# Patient Record
Sex: MALE | Race: WHITE | NOT HISPANIC OR LATINO
[De-identification: names, ages, dates, MRNs, and addresses within clinical notes are randomized per-mention and may not be internally consistent; named-entity substitution may affect disease eponyms.]

---

## 2021-02-12 ENCOUNTER — NON-APPOINTMENT (OUTPATIENT)
Age: 56
End: 2021-02-12

## 2021-02-12 ENCOUNTER — APPOINTMENT (OUTPATIENT)
Dept: OPHTHALMOLOGY | Facility: CLINIC | Age: 56
End: 2021-02-12
Payer: COMMERCIAL

## 2021-02-12 PROCEDURE — 92004 COMPRE OPH EXAM NEW PT 1/>: CPT

## 2021-02-12 PROCEDURE — 92285 EXTERNAL OCULAR PHOTOGRAPHY: CPT

## 2021-02-12 PROCEDURE — 99072 ADDL SUPL MATRL&STAF TM PHE: CPT

## 2021-03-12 ENCOUNTER — APPOINTMENT (OUTPATIENT)
Dept: OPHTHALMOLOGY | Facility: CLINIC | Age: 56
End: 2021-03-12
Payer: MEDICAID

## 2021-03-12 ENCOUNTER — NON-APPOINTMENT (OUTPATIENT)
Age: 56
End: 2021-03-12

## 2021-03-12 PROCEDURE — 92025 CPTRIZED CORNEAL TOPOGRAPHY: CPT

## 2021-03-12 PROCEDURE — 99072 ADDL SUPL MATRL&STAF TM PHE: CPT

## 2021-03-12 PROCEDURE — 92136 OPHTHALMIC BIOMETRY: CPT

## 2021-03-12 PROCEDURE — 92012 INTRM OPH EXAM EST PATIENT: CPT

## 2021-04-03 ENCOUNTER — TRANSCRIPTION ENCOUNTER (OUTPATIENT)
Age: 56
End: 2021-04-03

## 2021-04-06 ENCOUNTER — NON-APPOINTMENT (OUTPATIENT)
Age: 56
End: 2021-04-06

## 2021-04-06 ENCOUNTER — APPOINTMENT (OUTPATIENT)
Dept: OPHTHALMOLOGY | Facility: AMBULATORY SURGERY CENTER | Age: 56
End: 2021-04-06
Payer: MEDICAID

## 2021-04-06 PROCEDURE — 66984 XCAPSL CTRC RMVL W/O ECP: CPT | Mod: LT

## 2021-04-07 ENCOUNTER — APPOINTMENT (OUTPATIENT)
Dept: OPHTHALMOLOGY | Facility: CLINIC | Age: 56
End: 2021-04-07
Payer: MEDICAID

## 2021-04-07 ENCOUNTER — NON-APPOINTMENT (OUTPATIENT)
Age: 56
End: 2021-04-07

## 2021-04-07 PROCEDURE — 99024 POSTOP FOLLOW-UP VISIT: CPT

## 2021-04-14 ENCOUNTER — APPOINTMENT (OUTPATIENT)
Dept: OPHTHALMOLOGY | Facility: CLINIC | Age: 56
End: 2021-04-14
Payer: MEDICAID

## 2021-04-14 ENCOUNTER — NON-APPOINTMENT (OUTPATIENT)
Age: 56
End: 2021-04-14

## 2021-04-14 PROCEDURE — 99024 POSTOP FOLLOW-UP VISIT: CPT

## 2021-05-03 ENCOUNTER — APPOINTMENT (OUTPATIENT)
Dept: OPHTHALMOLOGY | Facility: CLINIC | Age: 56
End: 2021-05-03
Payer: MEDICAID

## 2021-05-03 ENCOUNTER — NON-APPOINTMENT (OUTPATIENT)
Age: 56
End: 2021-05-03

## 2021-05-03 PROCEDURE — 99024 POSTOP FOLLOW-UP VISIT: CPT

## 2021-05-19 ENCOUNTER — HOSPITAL ENCOUNTER (INPATIENT)
Dept: HOSPITAL 74 - YASAS | Age: 56
LOS: 5 days | Discharge: OTHER INSTITUTION | DRG: 773 | End: 2021-05-24
Attending: ALLERGY & IMMUNOLOGY | Admitting: ALLERGY & IMMUNOLOGY
Payer: COMMERCIAL

## 2021-05-19 VITALS — BODY MASS INDEX: 28.1 KG/M2

## 2021-05-19 DIAGNOSIS — F19.24: ICD-10-CM

## 2021-05-19 DIAGNOSIS — Z98.42: ICD-10-CM

## 2021-05-19 DIAGNOSIS — F19.282: ICD-10-CM

## 2021-05-19 DIAGNOSIS — F41.9: ICD-10-CM

## 2021-05-19 DIAGNOSIS — F31.9: ICD-10-CM

## 2021-05-19 DIAGNOSIS — F19.280: ICD-10-CM

## 2021-05-19 DIAGNOSIS — Z88.0: ICD-10-CM

## 2021-05-19 DIAGNOSIS — F11.23: Primary | ICD-10-CM

## 2021-05-19 DIAGNOSIS — F17.210: ICD-10-CM

## 2021-05-19 DIAGNOSIS — Z91.5: ICD-10-CM

## 2021-05-19 DIAGNOSIS — Z59.0: ICD-10-CM

## 2021-05-19 DIAGNOSIS — F43.10: ICD-10-CM

## 2021-05-19 DIAGNOSIS — Z56.0: ICD-10-CM

## 2021-05-19 PROCEDURE — U0003 INFECTIOUS AGENT DETECTION BY NUCLEIC ACID (DNA OR RNA); SEVERE ACUTE RESPIRATORY SYNDROME CORONAVIRUS 2 (SARS-COV-2) (CORONAVIRUS DISEASE [COVID-19]), AMPLIFIED PROBE TECHNIQUE, MAKING USE OF HIGH THROUGHPUT TECHNOLOGIES AS DESCRIBED BY CMS-2020-01-R: HCPCS

## 2021-05-19 PROCEDURE — C9803 HOPD COVID-19 SPEC COLLECT: HCPCS

## 2021-05-19 PROCEDURE — HZ2ZZZZ DETOXIFICATION SERVICES FOR SUBSTANCE ABUSE TREATMENT: ICD-10-PCS | Performed by: ALLERGY & IMMUNOLOGY

## 2021-05-19 PROCEDURE — U0005 INFEC AGEN DETEC AMPLI PROBE: HCPCS

## 2021-05-19 RX ADMIN — Medication SCH: at 23:14

## 2021-05-19 RX ADMIN — HYDROXYZINE PAMOATE SCH: 25 CAPSULE ORAL at 23:14

## 2021-05-19 SDOH — ECONOMIC STABILITY - HOUSING INSECURITY: HOMELESSNESS: Z59.0

## 2021-05-19 SDOH — ECONOMIC STABILITY - INCOME SECURITY: UNEMPLOYMENT, UNSPECIFIED: Z56.0

## 2021-05-20 LAB
ALBUMIN SERPL-MCNC: 3.2 G/DL (ref 3.4–5)
ALP SERPL-CCNC: 94 U/L (ref 45–117)
ALT SERPL-CCNC: 24 U/L (ref 13–61)
ANION GAP SERPL CALC-SCNC: 4 MMOL/L (ref 8–16)
AST SERPL-CCNC: 20 U/L (ref 15–37)
BILIRUB SERPL-MCNC: 0.4 MG/DL (ref 0.2–1)
BUN SERPL-MCNC: 13.1 MG/DL (ref 7–18)
CALCIUM SERPL-MCNC: 8.5 MG/DL (ref 8.5–10.1)
CHLORIDE SERPL-SCNC: 107 MMOL/L (ref 98–107)
CO2 SERPL-SCNC: 31 MMOL/L (ref 21–32)
CREAT SERPL-MCNC: 0.9 MG/DL (ref 0.55–1.3)
DEPRECATED RDW RBC AUTO: 14.5 % (ref 11.9–15.9)
GLUCOSE SERPL-MCNC: 80 MG/DL (ref 74–106)
HCT VFR BLD CALC: 39 % (ref 35.4–49)
HGB BLD-MCNC: 13.1 GM/DL (ref 11.7–16.9)
MCH RBC QN AUTO: 32 PG (ref 25.7–33.7)
MCHC RBC AUTO-ENTMCNC: 33.7 G/DL (ref 32–35.9)
MCV RBC: 95.1 FL (ref 80–96)
PLATELET # BLD AUTO: 289 K/MM3 (ref 134–434)
PMV BLD: 7.6 FL (ref 7.5–11.1)
PROT SERPL-MCNC: 6 G/DL (ref 6.4–8.2)
RBC # BLD AUTO: 4.1 M/MM3 (ref 4–5.6)
SODIUM SERPL-SCNC: 142 MMOL/L (ref 136–145)
WBC # BLD AUTO: 8.6 K/MM3 (ref 4–10)

## 2021-05-20 RX ADMIN — HYDROXYZINE PAMOATE SCH MG: 50 CAPSULE ORAL at 22:01

## 2021-05-20 RX ADMIN — Medication SCH TAB: at 10:30

## 2021-05-20 RX ADMIN — Medication SCH MG: at 22:01

## 2021-05-20 RX ADMIN — HYDROXYZINE PAMOATE SCH MG: 25 CAPSULE ORAL at 10:30

## 2021-05-20 RX ADMIN — GABAPENTIN SCH MG: 300 CAPSULE ORAL at 22:01

## 2021-05-20 RX ADMIN — HYDROXYZINE PAMOATE SCH MG: 50 CAPSULE ORAL at 18:19

## 2021-05-20 RX ADMIN — Medication SCH MG: at 22:00

## 2021-05-20 RX ADMIN — HYDROXYZINE PAMOATE SCH MG: 25 CAPSULE ORAL at 05:29

## 2021-05-20 RX ADMIN — GABAPENTIN SCH MG: 300 CAPSULE ORAL at 18:19

## 2021-05-21 RX ADMIN — HYDROXYZINE PAMOATE SCH MG: 50 CAPSULE ORAL at 10:20

## 2021-05-21 RX ADMIN — Medication SCH MG: at 22:46

## 2021-05-21 RX ADMIN — Medication SCH TAB: at 10:20

## 2021-05-21 RX ADMIN — HYDROXYZINE PAMOATE SCH MG: 50 CAPSULE ORAL at 18:32

## 2021-05-21 RX ADMIN — HYDROXYZINE PAMOATE SCH MG: 50 CAPSULE ORAL at 22:46

## 2021-05-21 RX ADMIN — HYDROXYZINE PAMOATE SCH MG: 50 CAPSULE ORAL at 06:31

## 2021-05-21 RX ADMIN — GABAPENTIN SCH MG: 300 CAPSULE ORAL at 06:31

## 2021-05-21 RX ADMIN — METHOCARBAMOL PRN MG: 500 TABLET ORAL at 22:46

## 2021-05-21 RX ADMIN — GABAPENTIN SCH MG: 300 CAPSULE ORAL at 13:56

## 2021-05-21 RX ADMIN — METHOCARBAMOL PRN MG: 500 TABLET ORAL at 10:20

## 2021-05-21 RX ADMIN — GABAPENTIN SCH MG: 300 CAPSULE ORAL at 22:46

## 2021-05-22 RX ADMIN — GABAPENTIN SCH MG: 300 CAPSULE ORAL at 13:25

## 2021-05-22 RX ADMIN — GABAPENTIN SCH MG: 300 CAPSULE ORAL at 23:06

## 2021-05-22 RX ADMIN — HYDROXYZINE PAMOATE SCH MG: 50 CAPSULE ORAL at 10:08

## 2021-05-22 RX ADMIN — SUVOREXANT PRN MG: 10 TABLET, FILM COATED ORAL at 23:06

## 2021-05-22 RX ADMIN — Medication SCH MG: at 23:06

## 2021-05-22 RX ADMIN — HYDROXYZINE PAMOATE SCH MG: 50 CAPSULE ORAL at 23:06

## 2021-05-22 RX ADMIN — HYDROXYZINE PAMOATE SCH MG: 50 CAPSULE ORAL at 17:25

## 2021-05-22 RX ADMIN — Medication SCH TAB: at 10:07

## 2021-05-22 RX ADMIN — GABAPENTIN SCH MG: 300 CAPSULE ORAL at 06:41

## 2021-05-22 RX ADMIN — HYDROXYZINE PAMOATE SCH MG: 50 CAPSULE ORAL at 06:42

## 2021-05-23 RX ADMIN — GABAPENTIN SCH MG: 300 CAPSULE ORAL at 22:07

## 2021-05-23 RX ADMIN — HYDROXYZINE PAMOATE SCH MG: 50 CAPSULE ORAL at 11:25

## 2021-05-23 RX ADMIN — METHOCARBAMOL PRN MG: 500 TABLET ORAL at 10:19

## 2021-05-23 RX ADMIN — GABAPENTIN SCH MG: 300 CAPSULE ORAL at 14:11

## 2021-05-23 RX ADMIN — GABAPENTIN SCH MG: 300 CAPSULE ORAL at 06:38

## 2021-05-23 RX ADMIN — Medication SCH TAB: at 10:19

## 2021-05-23 RX ADMIN — HYDROXYZINE PAMOATE SCH MG: 50 CAPSULE ORAL at 18:12

## 2021-05-23 RX ADMIN — HYDROXYZINE PAMOATE SCH MG: 50 CAPSULE ORAL at 22:08

## 2021-05-23 RX ADMIN — SUVOREXANT PRN MG: 10 TABLET, FILM COATED ORAL at 22:10

## 2021-05-23 RX ADMIN — HYDROXYZINE PAMOATE SCH MG: 50 CAPSULE ORAL at 06:38

## 2021-05-23 RX ADMIN — Medication SCH MG: at 22:07

## 2021-05-24 VITALS — HEART RATE: 75 BPM | SYSTOLIC BLOOD PRESSURE: 150 MMHG | DIASTOLIC BLOOD PRESSURE: 80 MMHG | TEMPERATURE: 98.2 F

## 2021-05-24 RX ADMIN — Medication SCH TAB: at 10:47

## 2021-05-24 RX ADMIN — GABAPENTIN SCH MG: 300 CAPSULE ORAL at 06:01

## 2021-05-24 RX ADMIN — HYDROXYZINE PAMOATE SCH: 50 CAPSULE ORAL at 05:54

## 2021-05-24 RX ADMIN — HYDROXYZINE PAMOATE SCH MG: 50 CAPSULE ORAL at 10:47

## 2021-06-02 ENCOUNTER — APPOINTMENT (OUTPATIENT)
Dept: OPHTHALMOLOGY | Facility: CLINIC | Age: 56
End: 2021-06-02

## 2023-08-07 ENCOUNTER — EMERGENCY (EMERGENCY)
Facility: HOSPITAL | Age: 58
LOS: 1 days | Discharge: HOME | End: 2023-08-07
Attending: EMERGENCY MEDICINE | Admitting: EMERGENCY MEDICINE
Payer: MEDICAID

## 2023-08-07 VITALS
SYSTOLIC BLOOD PRESSURE: 128 MMHG | RESPIRATION RATE: 18 BRPM | DIASTOLIC BLOOD PRESSURE: 86 MMHG | HEART RATE: 100 BPM | OXYGEN SATURATION: 99 % | TEMPERATURE: 99 F

## 2023-08-07 VITALS
TEMPERATURE: 97 F | SYSTOLIC BLOOD PRESSURE: 110 MMHG | OXYGEN SATURATION: 95 % | RESPIRATION RATE: 16 BRPM | DIASTOLIC BLOOD PRESSURE: 68 MMHG | HEART RATE: 72 BPM

## 2023-08-07 PROCEDURE — 99284 EMERGENCY DEPT VISIT MOD MDM: CPT

## 2023-08-07 RX ORDER — HALOPERIDOL DECANOATE 100 MG/ML
5 INJECTION INTRAMUSCULAR EVERY 6 HOURS
Refills: 0 | Status: DISCONTINUED | OUTPATIENT
Start: 2023-08-07 | End: 2023-08-11

## 2023-08-07 RX ADMIN — Medication 2 MILLIGRAM(S): at 12:48

## 2023-08-07 RX ADMIN — HALOPERIDOL DECANOATE 5 MILLIGRAM(S): 100 INJECTION INTRAMUSCULAR at 12:48
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
## 2023-08-07 NOTE — ED PROVIDER NOTE - NSFOLLOWUPINSTRUCTIONS_ED_ALL_ED_FT
Abuse of Alcohol    WHAT YOU NEED TO KNOW:    Alcohol abuse means you drink more than the recommended daily or weekly limits. You may be drinking alcohol regularly or drinking large amounts in a short period of time (binge drinking). You continue to drink even when it causes legal, work, or relationship problems.    DISCHARGE INSTRUCTIONS:    Call your local emergency number (911 in the ), or have someone call if:    You have sudden chest pain or trouble breathing.    You want to harm yourself or others.    You have a seizure.  Seek care immediately if:    You cannot stop vomiting or you vomit blood.    Call your doctor if:    You have hallucinations (you see or hear things that are not real).    You have questions or concerns about your condition or care.  Medicines:    Vitamin supplements may be given to treat low vitamin levels. Alcohol can make it hard for your body to absorb enough vitamins such as B1. Vitamin supplements may also be given to prevent alcohol-related brain damage.    Take your medicine as directed. Contact your healthcare provider if you think your medicine is not helping or if you have side effects. Tell your provider if you are allergic to any medicine. Keep a list of the medicines, vitamins, and herbs you take. Include the amounts, and when and why you take them. Bring the list or the pill bottles to follow-up visits. Carry your medicine list with you in case of an emergency.  Health problems alcohol abuse can cause:    Cancer in your liver, pancreas, stomach, colon, kidney, or breast    Stroke or a heart attack    Liver, kidney, or lung disease    Blackouts, memory loss, brain damage, or dementia    Diabetes, immune system problems, or thiamine (vitamin B1) deficiency    Problems for you and your baby if you drink while pregnant  Recommended alcohol limits: One drink is defined as 12 oz of beer, 5 oz of wine, or 1.5 oz of liquor such as whiskey.    Men 21 to 64 years should limit alcohol to 2 drinks a day. Do not have more than 4 drinks in 1 day or more than 14 in 1 week.    All women, and men 65 or older should limit alcohol to 1 drink in a day. Do not have more than 3 drinks in 1 day or more than 7 in 1 week. Do not drink alcohol if you are pregnant.  Manage alcohol use:    Work with healthcare providers on goals to drink less. This can help prevent health problems. For example, you may start by planning your weekly alcohol use. It will be easier to have fewer drinks if you plan ahead.    Have food when you drink alcohol. Food will prevent alcohol from getting into your system too quickly. Eat before you have your first alcohol drink.    Time your drinks carefully. Have no more than 1 drink in an hour. Have a liquid such as water, coffee, or a soft drink between alcohol drinks.    Do not drive if you have had alcohol. Plan ahead so you have a safe ride home. Make sure someone who has not been drinking can help you get home safely. Plan to use a taxi or other ride service, if needed.    Do not drink alcohol if you are taking medicine. Alcohol is dangerous when you combine it with certain medicines, such as acetaminophen or blood pressure medicine. Talk to your healthcare provider about all the medicines you currently take.  Follow up with your doctor as directed: Write down your questions so you remember to ask them during your visits.    For support and more information:    Alcoholics Anonymous  Web Address: http://www.aa.org  Substance Abuse and Mental Health Services Administration  [address]  [place] [address]  Web Address: http://www.samhsa.gov

## 2023-08-07 NOTE — ED PROVIDER NOTE - PROGRESS NOTE DETAILS
Marilyn: Patient seen and examined in triage. Combative and angry, swinging at staff. decision's made with staff and security to give 5 and 2 allergies confirmed. Iraida PGY2: Patient reevaluated, is now clinically sober, will obtain repeat vitals and discharge. Received patient on signout from Dr. Chairez  Patient woke up had something to eat, alert and oriented.  Has no complaints other than asking to eat.  Denies any trauma.  Now ambulatory without requiring any assistance.  Will repeat vitals and discharge home.
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
## 2023-08-07 NOTE — PROVIDER CONTACT NOTE (OTHER) - ASSESSMENT
Met with pt at bedside; pt stays in a tent in [place]; pt's girlfriend/partner is dorie being discharged and they will leave together.  [employer] requested-they will return to their tent area.

## 2023-08-07 NOTE — ED PROVIDER NOTE - NSICDXPASTMEDICALHX_GEN_ALL_CORE_FT
PAST MEDICAL HISTORY:  Asthma     Blind, unilateral left eye    Chronic back pain greater than 3 months duration

## 2023-08-07 NOTE — ED PROVIDER NOTE - PATIENT PORTAL LINK FT
You can access the FollowMyHealth Patient Portal offered by [facility] by registering at the following website: [email or web address]. By joining [employer]’s FollowMyHealth portal, you will also be able to view your health information using other applications (apps) compatible with our system.

## 2023-08-07 NOTE — ED ADULT NURSE NOTE - OBJECTIVE STATEMENT
Patient presenting to the ED for possible alcohol intoxication. The patient was noted aggressive and combative at triage and was placed in 4 point restraints. The patient was given medication by triage nurse. Patient noted sleeping at this time.  No distress noted at this time. Patient withdraws from pain. STEPHANIE VALLES

## 2023-08-07 NOTE — ED PROVIDER NOTE - PHYSICAL EXAMINATION
Patient found sleeping comfortably in the stretcher after chemical sedation.  Two-point restraints placed.  Patient breathing on room air and maintaining his airway. Patient found sleeping comfortably in the stretcher after chemical sedation.  Two-point restraints placed.  Patient breathing on room air and maintaining his airway. L pupil deformity iso hx traumatic cataract. R eye pinpoint. Patient arousal with sternal rub.

## 2023-08-07 NOTE — ED PROVIDER NOTE - OBJECTIVE STATEMENT
57-year-old male patient past medical history of asthma, EtOH abuse brought in via EMS for agitation in the setting of alcohol intoxication.  Patient became agitated in the ambulance bay and was a threat to staff.  Patient was chemically sedated with Haldol and Ativan.  Currently sleeping on the stretcher with two-point restraints.

## 2023-08-07 NOTE — ED PROVIDER NOTE - ATTENDING CONTRIBUTION TO CARE
57 M brought in by EMS for presumed intoxication.  Patient at time of triage was verbal, endorsed drinking alcohol, but increasingly agitated.  Evaluation at triage deemed patient threatening and hostile and received sedation.  Then sent to main area of ED for continued evaluation.  Patient received in 4 points and with increasing lethargy, withdrawing to pain, nontender abdominal exam.  No obvious signs of trauma. R pupil 2mm, sluggish, L pupil irreg/dilated, hx of blindness in L eye, neck NT to palp, clear lungs, shallow resp, heart reg, abd soft, NT to palp, moves all ext. to physical stim, good distal pulses, no ecchymoses.

## 2023-08-07 NOTE — ED ADULT NURSE NOTE - NSFALLHARMRISKINTERV_ED_ALL_ED

## 2023-08-07 NOTE — ED ADULT NURSE NOTE - NS ED PATIENT SAFETY CONCERN
Clear bilaterally, pupils equal, round and reactive to light.
Unable to assess due to medical condition

## 2024-01-09 ENCOUNTER — EMERGENCY (EMERGENCY)
Facility: HOSPITAL | Age: 59
LOS: 1 days | Discharge: HOME | End: 2024-01-09
Attending: EMERGENCY MEDICINE | Admitting: EMERGENCY MEDICINE
Payer: MEDICAID

## 2024-01-09 VITALS
RESPIRATION RATE: 18 BRPM | OXYGEN SATURATION: 95 % | TEMPERATURE: 98 F | SYSTOLIC BLOOD PRESSURE: 169 MMHG | DIASTOLIC BLOOD PRESSURE: 83 MMHG | HEART RATE: 99 BPM

## 2024-01-09 PROCEDURE — 99284 EMERGENCY DEPT VISIT MOD MDM: CPT

## 2024-01-09 RX ORDER — DIPHENHYDRAMINE HCL 50 MG
50 CAPSULE ORAL ONCE
Refills: 0 | Status: COMPLETED | OUTPATIENT
Start: 2024-01-09 | End: 2024-01-09

## 2024-01-09 RX ORDER — HALOPERIDOL DECANOATE 100 MG/ML
5 INJECTION INTRAMUSCULAR ONCE
Refills: 0 | Status: COMPLETED | OUTPATIENT
Start: 2024-01-09 | End: 2024-01-09

## 2024-01-09 RX ADMIN — HALOPERIDOL DECANOATE 5 MILLIGRAM(S): 100 INJECTION INTRAMUSCULAR at 16:33

## 2024-01-09 RX ADMIN — Medication 2 MILLIGRAM(S): at 16:33

## 2024-01-09 RX ADMIN — Medication 50 MILLIGRAM(S): at 16:33

## 2024-01-09 NOTE — ED PROVIDER NOTE - PHYSICAL EXAMINATION
VS:  unremarkable except HTN    GEN - Appears intoxicated, superficially cooperative   A+O x3   HEAD - NC/AT     ENT - PEERL, EOMI, mucous membranes    dry , no discharge      NECK: Neck supple, non-tender without lymphadenopathy, no masses, no JVD  PULM - CTA b/l,  symmetric breath sounds  COR -  normal heart sounds    ABD - , ND, NT, soft,  BACK - no CVA tenderness, nontender spine     EXTREMS - no edema, no deformity, warm and well perfused    SKIN - no rash    or bruising      NEUROLOGIC - alert, face symmetric, speech fluent, sensation nl, motor no focal deficit.

## 2024-01-09 NOTE — ED PROVIDER NOTE - PROGRESS NOTE DETAILS
Reviewed self referred Heart Scan result with patient. CAC score is 0.   Discussed heart healthy lifestyle and importance of risk factor modification with patient, as well as importance of continued compliance with PCP screenings for HTN, diabetes, cholesterol, etc. Pt verbalized understanding and denies any further questions or concerns at present.    Incidental findings of dilated pulmonary artery also reviewed with patient, she will follow up with her PCP.   DD ED ATTG: pt reassessed, still sleeping.  Reass upon awakening. OVIDIO: Was signed out to me pending sobriety and reassessment in the morning.  Patient now awake and alert and his gait is steady and stable.  Patient has no complaints of pain or other discomfort.  Will discharge patient home at this time.  Return precautions explained and understood and strongly advised to decrease alcohol ingestion. DD ED ATTG: pt reassessed, still sleeping.  Reass upon awakening.  No longer in restraints.

## 2024-01-09 NOTE — ED ADULT NURSE NOTE - NSFALLRISKINTERV_ED_ALL_ED
Assistance OOB with selected safe patient handling equipment if applicable/Assistance with ambulation/Communicate fall risk and risk factors to all staff, patient, and family/Encourage patient to sit up slowly, dangle for a short time, stand at bedside before walking/Monitor gait and stability/Monitor for mental status changes and reorient to person, place, and time, as needed/Orthostatic vital signs/Provide visual cue: yellow wristband, yellow gown, etc/Reinforce activity limits and safety measures with patient and family/Review medications for side effects contributing to fall risk/Toileting schedule using arm’s reach rule for commode and bathroom/Use of alarms - bed, stretcher, chair and/or video monitoring/Call bell, personal items and telephone in reach/Instruct patient to call for assistance before getting out of bed/chair/stretcher/Non-slip footwear applied when patient is off stretcher/[place] to call system/Physically safe environment - no spills, clutter or unnecessary equipment/Purposeful Proactive Rounding/Room/bathroom lighting operational, light cord in reach Assistance OOB with selected safe patient handling equipment if applicable/Assistance with ambulation/Communicate fall risk and risk factors to all staff, patient, and family/Encourage patient to sit up slowly, dangle for a short time, stand at bedside before walking/Monitor gait and stability/Monitor for mental status changes and reorient to person, place, and time, as needed/Orthostatic vital signs/Provide visual cue: yellow wristband, yellow gown, etc/Reinforce activity limits and safety measures with patient and family/Review medications for side effects contributing to fall risk/Toileting schedule using arm’s reach rule for commode and bathroom/Use of alarms - bed, stretcher, chair and/or video monitoring/Call bell, personal items and telephone in reach/Instruct patient to call for assistance before getting out of bed/chair/stretcher/Non-slip footwear applied when patient is off stretcher/[place] to call system/Physically safe environment - no spills, clutter or unnecessary equipment/Purposeful Proactive Rounding/Room/bathroom lighting operational, light cord in reach

## 2024-01-09 NOTE — ED PROVIDER NOTE - NSFOLLOWUPINSTRUCTIONS_ED_ALL_ED_FT
FOLLOW UP WITH YOUR DOCTOR WITHIN 1 WEEK    CAN FOLLOW UP WITH [facility]    RETURN TO EMERGENCY DEPARTMENT FOR NEW OR WORSENING SYMPTOMS.
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
## 2024-01-09 NOTE — ED PROVIDER NOTE - CARE PLAN
1 Principal Discharge DX:	Alcohol intoxication  Secondary Diagnosis:	Agitation requiring sedation protocol
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
## 2024-01-09 NOTE — ED ADULT NURSE REASSESSMENT NOTE - NS ED NURSE REASSESS COMMENT FT1
Received pt and report at change of shift. Pt is sleeping in stretcher s/p IM meds received, respirations are even and unlabored. Pending medical reassessment and dispo when awake and clinically sober. Will continue to monitor for safety.

## 2024-01-09 NOTE — ED PROVIDER NOTE - PATIENT PORTAL LINK FT
You can access the FollowMyHealth Patient Portal offered by [facility] by registering at the following website: [email or web address]. By joining [employer]’s FollowMyHealth portal, you will also be able to view your health information using other applications (apps) compatible with our system. You can access the FollowMyHealth Patient Portal offered by [facility] by registering at the following website: [email or web address]. By joining [employer]s FollowMyHealth portal, you will also be able to view your health information using other applications (apps) compatible with our system.
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
## 2024-01-10 VITALS
SYSTOLIC BLOOD PRESSURE: 133 MMHG | OXYGEN SATURATION: 100 % | HEART RATE: 91 BPM | RESPIRATION RATE: 16 BRPM | TEMPERATURE: 98 F | DIASTOLIC BLOOD PRESSURE: 69 MMHG

## 2024-01-10 NOTE — ED ADULT NURSE REASSESSMENT NOTE - NS ED NURSE REASSESS COMMENT FT1
Pt awakened, a&ox3, calm and cooperative. Pt denies HA, dizziness, NV. Vitals as stated, ambulating with steady gait to bathroom,. Viky hydration provided and tolerated. Pt denies current S/I H/I I/P. Pending medical reassessment and dispo. Will continue to monitor for safety.

## 2024-06-06 ENCOUNTER — EMERGENCY (EMERGENCY)
Facility: HOSPITAL | Age: 59
LOS: 1 days | Discharge: HOME | End: 2024-06-06
Attending: STUDENT IN AN ORGANIZED HEALTH CARE EDUCATION/TRAINING PROGRAM | Admitting: EMERGENCY MEDICINE
Payer: MEDICAID

## 2024-06-06 VITALS
WEIGHT: 198.42 LBS | HEART RATE: 82 BPM | HEIGHT: 68 IN | OXYGEN SATURATION: 98 % | RESPIRATION RATE: 18 BRPM | DIASTOLIC BLOOD PRESSURE: 78 MMHG | SYSTOLIC BLOOD PRESSURE: 130 MMHG

## 2024-06-06 PROCEDURE — 99283 EMERGENCY DEPT VISIT LOW MDM: CPT

## 2024-06-06 PROCEDURE — 93010 ELECTROCARDIOGRAM REPORT: CPT

## 2024-06-06 NOTE — ED PROVIDER NOTE - NSFOLLOWUPINSTRUCTIONS_ED_ALL_ED_FT
Overview:  You were brought in due to alcohol intoxication. You have been observed and are now considered clinically sober. Before you leave, please read the following instructions carefully to ensure your safety and well-being.    Instructions:    Supervision:    Do not drive, operate machinery, or perform any activities that require full alertness for at least 24 hours.  Hydration and Nutrition:    Drink water or electrolyte-replenishing beverages to stay hydrated.  Eat light and easily digestible foods to help restore nutrients.  Rest:    Ensure you get adequate rest. Alcohol can disrupt your sleep cycle, so it's important to allow your body to recover.  Alcohol Consumption:    Avoid consuming alcohol for at least 48 hours. Consider this incident as an opportunity to reflect on your drinking habits and the impact on your health.  Medication:    Do not take any medication without consulting your healthcare provider, especially those that can interact with alcohol (e.g., sedatives, certain pain medications).  Monitoring:    Monitor your health. If you experience unusual symptoms such as severe headache, persistent vomiting, seizures, difficulty breathing, chest pain, or any new or concerning symptoms, seek medical attention immediately.  Follow-up Care:    Consider scheduling an appointment with your primary care provider or a counselor to discuss your alcohol use and any necessary lifestyle changes.  Safety and Support:    If you feel that you or someone else is at risk of harm or if you have concerns about alcohol dependence, seek support. There are resources available to provide help.

## 2024-06-06 NOTE — ED PROVIDER NOTE - PROGRESS NOTE DETAILS
Edwar PGY3: Patient endorsed to me at sign out. Seen and assessed at bedside - clinically sober, states would go home with wife on bus, requesting [employer]. States they called ems because 'too drunk'. Denies fighting. Requesting clothes back. Has ambulated and tolerated PO. Offered SW and SBIRT info but he states he has and declines Uziel FRENCH: Received in sign out pending sobriety.  Clinically sober ambulating around ED eager to go home

## 2024-06-06 NOTE — ED ADULT NURSE NOTE - OBJECTIVE STATEMENT
Pt arrives to room 7A A&Ox3 and ambulatory at baseline. PH of Asthma. Pt brought to ED by wife for intoxication. Pt states he drinks everyday, "anywhere from 1-2 pints of vodka". Pt states drinking 2 pints of vodka prior to arrival. Pt also admits to smoking marijuana occasionally. Respirations even and unlabored on room air. No acute distress noted. Pt sleeping in stretcher at this time. Arousable to verbal and tactile stimuli. Denies headache, dizziness, chest pain, SOB, fevers, chills, nausea, vomiting and diarrhea at this time. No acute distress noted. VS as noted in flowsheet. Pt noted to be mildly tremulous with arms extended. MD Pretty made aware. Pt provided with food. Plan of care ongoing, comfort measures provided and safety measures maintained. Awaiting further orders.

## 2024-06-06 NOTE — ED PROVIDER NOTE - PATIENT PORTAL LINK FT
You can access the FollowMyHealth Patient Portal offered by [facility] by registering at the following website: [email or web address]. By joining [facility]’s FollowMyHealth portal, you will also be able to view your health information using other applications (apps) compatible with our system.

## 2024-06-06 NOTE — ED PROVIDER NOTE - ATTENDING CONTRIBUTION TO CARE
I performed a history and physical exam of the patient and discussed their management with the resident/fellow/ACP/student. I have reviewed the resident/fellow/ACP/student note and agree with the documented findings and plan of care, except as noted. I have personally performed a substantive portion of the visit including all aspects of the medical decision making. My medical decision making and observations are found above. Please refer to any progress notes for updates on clinical course.    58-year-old male with past medical history of left eye blindness/pupil deformity presenting for reported alcohol intoxication.  Patient limited historian due to intoxicated state.  Admits to drinking a lot of alcohol today and denies any history of alcohol withdrawal/seizures.  Denies any drug use.  Denies any fall/trauma.  Patient has no acute complaints at this time and denies any headache, chest pain, shortness of breath, abdominal pain/back pain, fever/chills, N/V/D, cough, rashes, or changes in urination. No SI/HI.     Gen: WDWN, NAD, comfortable appearing, hemodynamically stable, AOB, clinically intoxicated   HEENT: Atraumatic head, Left eye known pupil deformity/coloboma, EOMI, no nasal discharge, mucous membranes moist   CV: RRR, +S1/S2, no M/R/G, equal b/l radial pulses 2+  Resp: CTAB, no W/R/R, SPO2 >95% on RA, no increased WOB   GI: Abdomen soft non-distended, NTTP, no masses/organomegaly   MSK/Skin: No midline spinal TTP, no CVA tenderness, no open wounds, no bruising, no LE edema, no hip TTP/pelvic laxity   Neuro: A&Ox2, moving all 4 extremities spontaneously, gross sensation intact in UE and LE BL  Psych: appropriate mood    MDM:   58-year-old male with past medical history of left eye blindness/pupil deformity presenting for reported alcohol intoxication, endorses drinking with no drug use, no fall/trauma and no external signs of trauma, AAOx2 and clinically intoxicated, fingerstick 100, otherwise well-appearing, no SI/HI.  Will reassess patient when clinically sober and continue to monitor but no indication for labs/imaging at this time. Will likely need reassessment in multiple hours; will signout plan to next oncoming provider

## 2024-06-06 NOTE — ED ADULT TRIAGE NOTE - CHIEF COMPLAINT QUOTE
pt appears intoxicated,  admits for drinking alcohol, pts wife taken to another ED for same complaint, no signs of trauma noted

## 2024-06-07 VITALS
OXYGEN SATURATION: 95 % | HEART RATE: 78 BPM | SYSTOLIC BLOOD PRESSURE: 158 MMHG | TEMPERATURE: 98 F | RESPIRATION RATE: 20 BRPM | DIASTOLIC BLOOD PRESSURE: 95 MMHG

## 2024-06-07 NOTE — ED ADULT NURSE REASSESSMENT NOTE - NS ED NURSE REASSESS COMMENT FT1
break coverage rn. received report from REENA cali. pt A&Ox4, resp even unlabored, abd soft, pedal pulses 2+ bilaterally. Pt MTF safety maintained

## 2024-06-07 NOTE — ED ADULT NURSE REASSESSMENT NOTE - NS ED NURSE REASSESS COMMENT FT1
Upon reassessment pt A&Ox3 sitting up in stretcher. Pt ambulated with steady gait to bathroom. Respirations even and unlabored on room air. No acute distress noted. Denies headache, dizziness, chest pain and SOB at this time. VS as noted in flowsheet. Plan of care ongoing, comfort measures provided and safety measures maintained. Awaiting disposition.

## 2024-07-31 ENCOUNTER — INPATIENT (INPATIENT)
Facility: HOSPITAL | Age: 59
LOS: 1 days | Discharge: HOME | End: 2024-08-02
Attending: HOSPITALIST | Admitting: HOSPITALIST
Payer: MEDICAID

## 2024-07-31 VITALS
HEIGHT: 68 IN | DIASTOLIC BLOOD PRESSURE: 76 MMHG | RESPIRATION RATE: 16 BRPM | TEMPERATURE: 98 F | HEART RATE: 70 BPM | OXYGEN SATURATION: 99 % | SYSTOLIC BLOOD PRESSURE: 134 MMHG | WEIGHT: 139.99 LBS

## 2024-07-31 LAB
ALBUMIN SERPL ELPH-MCNC: 3.8 G/DL — SIGNIFICANT CHANGE UP (ref 3.3–5)
ALP SERPL-CCNC: 114 U/L — SIGNIFICANT CHANGE UP (ref 40–120)
ALT FLD-CCNC: 158 U/L — HIGH (ref 4–41)
ANION GAP SERPL CALC-SCNC: 14 MMOL/L — SIGNIFICANT CHANGE UP (ref 7–14)
AST SERPL-CCNC: 173 U/L — HIGH (ref 4–40)
BASOPHILS # BLD AUTO: 0.07 K/UL — SIGNIFICANT CHANGE UP (ref 0–0.2)
BASOPHILS NFR BLD AUTO: 0.9 % — SIGNIFICANT CHANGE UP (ref 0–2)
BILIRUB SERPL-MCNC: 0.3 MG/DL — SIGNIFICANT CHANGE UP (ref 0.2–1.2)
BUN SERPL-MCNC: 9 MG/DL — SIGNIFICANT CHANGE UP (ref 7–23)
CALCIUM SERPL-MCNC: 8.5 MG/DL — SIGNIFICANT CHANGE UP (ref 8.4–10.5)
CHLORIDE SERPL-SCNC: 106 MMOL/L — SIGNIFICANT CHANGE UP (ref 98–107)
CO2 SERPL-SCNC: 23 MMOL/L — SIGNIFICANT CHANGE UP (ref 22–31)
CREAT SERPL-MCNC: 0.84 MG/DL — SIGNIFICANT CHANGE UP (ref 0.5–1.3)
EGFR: 102 ML/MIN/1.73M2 — SIGNIFICANT CHANGE UP
EOSINOPHIL # BLD AUTO: 0.45 K/UL — SIGNIFICANT CHANGE UP (ref 0–0.5)
EOSINOPHIL NFR BLD AUTO: 6 % — SIGNIFICANT CHANGE UP (ref 0–6)
GLUCOSE SERPL-MCNC: 108 MG/DL — HIGH (ref 70–99)
HCT VFR BLD CALC: 40.3 % — SIGNIFICANT CHANGE UP (ref 39–50)
HGB BLD-MCNC: 14 G/DL — SIGNIFICANT CHANGE UP (ref 13–17)
IANC: 3.29 K/UL — SIGNIFICANT CHANGE UP (ref 1.8–7.4)
IMM GRANULOCYTES NFR BLD AUTO: 0.1 % — SIGNIFICANT CHANGE UP (ref 0–0.9)
LIDOCAIN IGE QN: 128 U/L — HIGH (ref 7–60)
LYMPHOCYTES # BLD AUTO: 3.24 K/UL — SIGNIFICANT CHANGE UP (ref 1–3.3)
LYMPHOCYTES # BLD AUTO: 43.1 % — SIGNIFICANT CHANGE UP (ref 13–44)
MCHC RBC-ENTMCNC: 32.8 PG — SIGNIFICANT CHANGE UP (ref 27–34)
MCHC RBC-ENTMCNC: 34.7 GM/DL — SIGNIFICANT CHANGE UP (ref 32–36)
MCV RBC AUTO: 94.4 FL — SIGNIFICANT CHANGE UP (ref 80–100)
MONOCYTES # BLD AUTO: 0.45 K/UL — SIGNIFICANT CHANGE UP (ref 0–0.9)
MONOCYTES NFR BLD AUTO: 6 % — SIGNIFICANT CHANGE UP (ref 2–14)
NEUTROPHILS # BLD AUTO: 3.29 K/UL — SIGNIFICANT CHANGE UP (ref 1.8–7.4)
NEUTROPHILS NFR BLD AUTO: 43.9 % — SIGNIFICANT CHANGE UP (ref 43–77)
NRBC # BLD: 0 /100 WBCS — SIGNIFICANT CHANGE UP (ref 0–0)
NRBC # FLD: 0 K/UL — SIGNIFICANT CHANGE UP (ref 0–0)
PLATELET # BLD AUTO: 162 K/UL — SIGNIFICANT CHANGE UP (ref 150–400)
POTASSIUM SERPL-MCNC: 3.9 MMOL/L — SIGNIFICANT CHANGE UP (ref 3.5–5.3)
POTASSIUM SERPL-SCNC: 3.9 MMOL/L — SIGNIFICANT CHANGE UP (ref 3.5–5.3)
PROT SERPL-MCNC: 6.8 G/DL — SIGNIFICANT CHANGE UP (ref 6–8.3)
RBC # BLD: 4.27 M/UL — SIGNIFICANT CHANGE UP (ref 4.2–5.8)
RBC # FLD: 13.9 % — SIGNIFICANT CHANGE UP (ref 10.3–14.5)
SODIUM SERPL-SCNC: 143 MMOL/L — SIGNIFICANT CHANGE UP (ref 135–145)
TROPONIN T, HIGH SENSITIVITY RESULT: 7 NG/L — SIGNIFICANT CHANGE UP
WBC # BLD: 7.51 K/UL — SIGNIFICANT CHANGE UP (ref 3.8–10.5)
WBC # FLD AUTO: 7.51 K/UL — SIGNIFICANT CHANGE UP (ref 3.8–10.5)

## 2024-07-31 PROCEDURE — 99285 EMERGENCY DEPT VISIT HI MDM: CPT

## 2024-07-31 PROCEDURE — 71045 X-RAY EXAM CHEST 1 VIEW: CPT | Mod: 26

## 2024-07-31 RX ORDER — GINGER ROOT/GINGER ROOT EXT 262.5 MG
500 CAPSULE ORAL ONCE
Refills: 0 | Status: COMPLETED | OUTPATIENT
Start: 2024-07-31 | End: 2024-07-31

## 2024-07-31 RX ORDER — MAGNESIUM, ALUMINUM HYDROXIDE 200-225/5
30 SUSPENSION, ORAL (FINAL DOSE FORM) ORAL ONCE
Refills: 0 | Status: COMPLETED | OUTPATIENT
Start: 2024-07-31 | End: 2024-07-31

## 2024-07-31 RX ORDER — FAMOTIDINE 40 MG/1
20 TABLET, FILM COATED ORAL ONCE
Refills: 0 | Status: COMPLETED | OUTPATIENT
Start: 2024-07-31 | End: 2024-07-31

## 2024-07-31 RX ORDER — BACTERIOSTATIC SODIUM CHLORIDE 0.9 %
1000 VIAL (ML) INJECTION ONCE
Refills: 0 | Status: COMPLETED | OUTPATIENT
Start: 2024-07-31 | End: 2024-07-31

## 2024-07-31 RX ORDER — ONDANSETRON HCL/PF 4 MG/2 ML
4 VIAL (ML) INJECTION ONCE
Refills: 0 | Status: COMPLETED | OUTPATIENT
Start: 2024-07-31 | End: 2024-07-31

## 2024-07-31 RX ADMIN — Medication 4 MILLIGRAM(S): at 18:39

## 2024-07-31 RX ADMIN — FAMOTIDINE 20 MILLIGRAM(S): 40 TABLET, FILM COATED ORAL at 18:39

## 2024-07-31 RX ADMIN — Medication 4 MILLIGRAM(S): at 22:41

## 2024-07-31 RX ADMIN — Medication 30 MILLILITER(S): at 18:38

## 2024-07-31 RX ADMIN — Medication 105 MILLIGRAM(S): at 18:39

## 2024-07-31 RX ADMIN — Medication 1000 MILLILITER(S): at 18:38

## 2024-07-31 NOTE — ED ADULT NURSE NOTE - OBJECTIVE STATEMENT
Patient presents to ED for left-sided abdominal pain associated with cough x2 weeks. He is A&Ox4, in no acute distress. States last drink 5 hours ago from time of this assessment. Per patient, drank half bottle of vodka. He says he drinks about 1 bottle of vodka per day. Denies nausea, vomiting. No hallucinations. CIWA negative. No CP, dizziness, SOB, dyspnea, fevers, chills. History of ETOH abuse.

## 2024-07-31 NOTE — ED PROVIDER NOTE - OBJECTIVE STATEMENT
59 y/o M with PMH AUD presents with LUQ/epigastric abdominal pain, radiating to L chest. Patient states when he coughs the pain is worse. Drink >1/5 a liter bottle of liquor a day. Last drink 5 hours prior to arrival. Denies vomiting, diarrhea, fevers, chills. +nausea. No known sick contacts or recent travel. No known history of trauma.

## 2024-07-31 NOTE — ED PROVIDER NOTE - ATTENDING CONTRIBUTION TO CARE
DR. BENITEZ, ATTENDING MD-  I performed a face to face bedside interview with the patient regarding history of present illness, review of symptoms and past medical history. I completed an independent physical exam.  I have discussed the patient's plan of care with the fellow.  Documentation as above in the note.    57 y/o male h/o etoh abuse here with luq pain a/w n/v x2 wks.  Eval for pancreatitis, etoh gastritis, atypical acs, ptx.  Obtain cbc cmp trop ekg cxr give antacids reassess.

## 2024-07-31 NOTE — ED ADULT TRIAGE NOTE - BMI (KG/M2)
Jaime from [facility] stated pt needed refill on Imdur and also wanted clarification if pt is to take Probiotic and Lactobacillus together every day or if it should be a different regimen.  Please advise 21.3

## 2024-07-31 NOTE — ED ADULT NURSE NOTE - NSFALLRISKINTERV_ED_ALL_ED
Assistance OOB with selected safe patient handling equipment if applicable/Assistance with ambulation/Communicate fall risk and risk factors to all staff, patient, and family/Monitor gait and stability/Monitor for mental status changes and reorient to person, place, and time, as needed/Provide visual cue: yellow wristband, yellow gown, etc/Reinforce activity limits and safety measures with patient and family/Toileting schedule using arm’s reach rule for commode and bathroom/Use of alarms - bed, stretcher, chair and/or video monitoring/Call bell, personal items and telephone in reach/Instruct patient to call for assistance before getting out of bed/chair/stretcher/Non-slip footwear applied when patient is off stretcher/[place] to call system/Physically safe environment - no spills, clutter or unnecessary equipment/Purposeful Proactive Rounding/Room/bathroom lighting operational, light cord in reach

## 2024-07-31 NOTE — ED ADULT NURSE NOTE - NSFALLRISK_ED_ALL_ED
[FreeTextEntry1] : h/o diabetes presenting at age 18 with polyuria and polydipsia. ALways insulin treated. Overweight at onset, with no h/o DKA. Progressive weight gain.\par ISABELLA positive, c-peptide negative\par has dexcom, not used recently\par lantus 30-35 units\par admelog variable dosing\par uses vials\par Lap band ineffective and was removed\par victoza ineffective\par qsymia not covered by insurance\par \par brother and sister diabetic with onset in teenage years, not obese.\par \par PMH:\par small incidental thyroid nodule
No

## 2024-07-31 NOTE — ED ADULT TRIAGE NOTE - CHIEF COMPLAINT QUOTE
Pt presents from home history of etoh abuse, last drink was this morning endorsing abdominal pain, intermittent to Lt side of abdomen x2 weeks accompanied with nausea and vomiting, denies dysuria/hematuria, afebrile.

## 2024-07-31 NOTE — ED ADULT NURSE REASSESSMENT NOTE - NS ED NURSE REASSESS COMMENT FT1
David received in spot 1a. Received report from REENA philip. Patient daily drinker, drank about half a bottle of vodka around noon. pt remains at baseline mental status, RR even unlabored completing full sentences. pt resting in stretcher comfortably at this time, no new complaints offered. stretcher lowest position siderails up safety measures in place.

## 2024-07-31 NOTE — ED PROVIDER NOTE - PHYSICAL EXAMINATION
Physical Exam  GEN: +clinically intoxicated  HEENT: NC/AT, PERRL, EOMI, normal oropharynx  NECK: Supple, nontender, FROM  CV: RRR, no m/r/g  PULM: CTA bilat, no wheezing/rales/rhonchi  ABD: Soft, +epigastric and LUQ TTP, no rebound or guarding  EXTR: FROM to all extremities, nontender, no edema  SKIN: Warm, dry, no rash  NEURO: AOx3, speaking full clear sentences

## 2024-07-31 NOTE — ED PROVIDER NOTE - CLINICAL SUMMARY MEDICAL DECISION MAKING FREE TEXT BOX
59 y/o M p/w 57 y/o M p/w epigastric/RUQ pain x1 day. Patient with significant AUD, last EtOH use 5 hours ago. Afebrile and hemodynamically stable, well-appearing otherwise. Most likely EtOH gastritis. Possible pancreatitis, given history of cough with L sided pain will r/o PNA vs. aspiration. R/o ACS. Will get basic labs, trop, lipase, LFTs, start IV fluids, GI cocktail, thiamine and reassess. Dispo pending w/u.

## 2024-07-31 NOTE — ED PROVIDER NOTE - PROGRESS NOTE DETAILS
Toi, PGY3: Patient signed out to me by day team.    Timpa - 58yM [1A]  AUD  epigastric/LUQ abd pain > rads to up  last drink 5hrs (usually 1/2 liter/day)  [ ] reassess > scan? MTF    Reassessment, patient has epigastric and left upper quadrant tenderness to palpation.  Lower concern for acute cholecystitis as patient has no Hollis sign despite LFT elevations.  Plan for CT scan. Toi PGY3: CT abdomen/pelvis shows evidence of focal wall thickening distal portion of stomach and proximal duodenum -consistent with gastroduodenitis.  Plan for discharge home with outpatient nonemergent upper endoscopy to assess for ulcer/lesion      On reassessment, patient states that he is having some nauseousness and vomited.  Patient ordered for additional dose of Zofran.  Will reassess after Zofran. Toi PGY3: Patient now has signs of withdrawal.  CIWA protocol added on with Librium 50 mg, Ativan 2 mg, CIWA protocol, folate, and thiamine. Admitted to medicine for withdrawal

## 2024-08-01 DIAGNOSIS — F10.239 ALCOHOL DEPENDENCE WITH WITHDRAWAL, UNSPECIFIED: ICD-10-CM

## 2024-08-01 DIAGNOSIS — R79.89 OTHER SPECIFIED ABNORMAL FINDINGS OF BLOOD CHEMISTRY: ICD-10-CM

## 2024-08-01 DIAGNOSIS — K29.90 GASTRODUODENITIS, UNSPECIFIED, WITHOUT BLEEDING: ICD-10-CM

## 2024-08-01 DIAGNOSIS — Z29.9 ENCOUNTER FOR PROPHYLACTIC MEASURES, UNSPECIFIED: ICD-10-CM

## 2024-08-01 DIAGNOSIS — R10.9 UNSPECIFIED ABDOMINAL PAIN: ICD-10-CM

## 2024-08-01 DIAGNOSIS — Z98.890 OTHER SPECIFIED POSTPROCEDURAL STATES: Chronic | ICD-10-CM

## 2024-08-01 DIAGNOSIS — J45.909 UNSPECIFIED ASTHMA, UNCOMPLICATED: ICD-10-CM

## 2024-08-01 LAB
ADD ON TEST-SPECIMEN IN LAB: SIGNIFICANT CHANGE UP
ALBUMIN SERPL ELPH-MCNC: 3.8 G/DL — SIGNIFICANT CHANGE UP (ref 3.3–5)
ALP SERPL-CCNC: 116 U/L — SIGNIFICANT CHANGE UP (ref 40–120)
ALT FLD-CCNC: 150 U/L — HIGH (ref 4–41)
ANION GAP SERPL CALC-SCNC: 19 MMOL/L — HIGH (ref 7–14)
APTT BLD: 33.1 SEC — SIGNIFICANT CHANGE UP (ref 24.5–35.6)
AST SERPL-CCNC: 153 U/L — HIGH (ref 4–40)
BASE EXCESS BLDV CALC-SCNC: 4.6 MMOL/L — HIGH (ref -2–3)
BILIRUB DIRECT SERPL-MCNC: <0.2 MG/DL — SIGNIFICANT CHANGE UP (ref 0–0.3)
BILIRUB INDIRECT FLD-MCNC: >0.2 MG/DL — SIGNIFICANT CHANGE UP (ref 0–1)
BILIRUB SERPL-MCNC: 0.4 MG/DL — SIGNIFICANT CHANGE UP (ref 0.2–1.2)
BLOOD GAS VENOUS COMPREHENSIVE RESULT: SIGNIFICANT CHANGE UP
BUN SERPL-MCNC: 8 MG/DL — SIGNIFICANT CHANGE UP (ref 7–23)
CALCIUM SERPL-MCNC: 8.9 MG/DL — SIGNIFICANT CHANGE UP (ref 8.4–10.5)
CHLORIDE BLDV-SCNC: 103 MMOL/L — SIGNIFICANT CHANGE UP (ref 96–108)
CHLORIDE SERPL-SCNC: 107 MMOL/L — SIGNIFICANT CHANGE UP (ref 98–107)
CO2 BLDV-SCNC: 32.5 MMOL/L — HIGH (ref 22–26)
CO2 SERPL-SCNC: 17 MMOL/L — LOW (ref 22–31)
CREAT SERPL-MCNC: 0.94 MG/DL — SIGNIFICANT CHANGE UP (ref 0.5–1.3)
EGFR: 95 ML/MIN/1.73M2 — SIGNIFICANT CHANGE UP
GAS PNL BLDV: 135 MMOL/L — LOW (ref 136–145)
GAS PNL BLDV: SIGNIFICANT CHANGE UP
GLUCOSE BLDV-MCNC: 98 MG/DL — SIGNIFICANT CHANGE UP (ref 70–99)
GLUCOSE SERPL-MCNC: 107 MG/DL — HIGH (ref 70–99)
HCO3 BLDV-SCNC: 31 MMOL/L — HIGH (ref 22–29)
HCT VFR BLDA CALC: 43 % — SIGNIFICANT CHANGE UP (ref 39–51)
HGB BLD CALC-MCNC: 14.4 G/DL — SIGNIFICANT CHANGE UP (ref 12.6–17.4)
INR BLD: <0.9 RATIO — LOW (ref 0.85–1.18)
LACTATE BLDV-MCNC: 1.6 MMOL/L — SIGNIFICANT CHANGE UP (ref 0.5–2)
MAGNESIUM SERPL-MCNC: 2.1 MG/DL — SIGNIFICANT CHANGE UP (ref 1.6–2.6)
PCO2 BLDV: 51 MMHG — SIGNIFICANT CHANGE UP (ref 42–55)
PH BLDV: 7.39 — SIGNIFICANT CHANGE UP (ref 7.32–7.43)
PHOSPHATE SERPL-MCNC: 3.2 MG/DL — SIGNIFICANT CHANGE UP (ref 2.5–4.5)
PO2 BLDV: 54 MMHG — HIGH (ref 25–45)
POTASSIUM BLDV-SCNC: 5 MMOL/L — SIGNIFICANT CHANGE UP (ref 3.5–5.1)
POTASSIUM SERPL-MCNC: 5 MMOL/L — SIGNIFICANT CHANGE UP (ref 3.5–5.3)
POTASSIUM SERPL-SCNC: 5 MMOL/L — SIGNIFICANT CHANGE UP (ref 3.5–5.3)
PROT SERPL-MCNC: 6.6 G/DL — SIGNIFICANT CHANGE UP (ref 6–8.3)
PROTHROM AB SERPL-ACNC: 9.5 SEC — SIGNIFICANT CHANGE UP (ref 9.5–13)
SAO2 % BLDV: 81.3 % — SIGNIFICANT CHANGE UP (ref 67–88)
SODIUM SERPL-SCNC: 143 MMOL/L — SIGNIFICANT CHANGE UP (ref 135–145)

## 2024-08-01 PROCEDURE — 99222 1ST HOSP IP/OBS MODERATE 55: CPT

## 2024-08-01 PROCEDURE — 93010 ELECTROCARDIOGRAM REPORT: CPT

## 2024-08-01 PROCEDURE — 74177 CT ABD & PELVIS W/CONTRAST: CPT | Mod: 26,MC

## 2024-08-01 PROCEDURE — 99223 1ST HOSP IP/OBS HIGH 75: CPT

## 2024-08-01 RX ORDER — CHLORDIAZEPOXIDE HCL 10 MG
50 CAPSULE ORAL ONCE
Refills: 0 | Status: DISCONTINUED | OUTPATIENT
Start: 2024-08-01 | End: 2024-08-01

## 2024-08-01 RX ORDER — ONDANSETRON HCL/PF 4 MG/2 ML
4 VIAL (ML) INJECTION EVERY 8 HOURS
Refills: 0 | Status: DISCONTINUED | OUTPATIENT
Start: 2024-08-01 | End: 2024-08-02

## 2024-08-01 RX ORDER — PANTOPRAZOLE SODIUM 20 MG/1
40 TABLET, DELAYED RELEASE ORAL
Refills: 0 | Status: DISCONTINUED | OUTPATIENT
Start: 2024-08-01 | End: 2024-08-02

## 2024-08-01 RX ORDER — GINGER ROOT/GINGER ROOT EXT 262.5 MG
100 CAPSULE ORAL ONCE
Refills: 0 | Status: COMPLETED | OUTPATIENT
Start: 2024-08-01 | End: 2024-08-01

## 2024-08-01 RX ORDER — ACETAMINOPHEN 500 MG
650 TABLET ORAL EVERY 6 HOURS
Refills: 0 | Status: DISCONTINUED | OUTPATIENT
Start: 2024-08-01 | End: 2024-08-02

## 2024-08-01 RX ORDER — IPRATROPIUM BROMIDE AND ALBUTEROL SULFATE 2.5; .5 MG/3ML; MG/3ML
3 SOLUTION RESPIRATORY (INHALATION) ONCE
Refills: 0 | Status: COMPLETED | OUTPATIENT
Start: 2024-08-01 | End: 2024-08-01

## 2024-08-01 RX ORDER — CHLORDIAZEPOXIDE HCL 10 MG
25 CAPSULE ORAL ONCE
Refills: 0 | Status: DISCONTINUED | OUTPATIENT
Start: 2024-08-01 | End: 2024-08-01

## 2024-08-01 RX ORDER — ONDANSETRON HCL/PF 4 MG/2 ML
4 VIAL (ML) INJECTION ONCE
Refills: 0 | Status: COMPLETED | OUTPATIENT
Start: 2024-08-01 | End: 2024-08-01

## 2024-08-01 RX ORDER — LORAZEPAM 1 MG/1
2 TABLET ORAL
Refills: 0 | Status: DISCONTINUED | OUTPATIENT
Start: 2024-08-01 | End: 2024-08-02

## 2024-08-01 RX ORDER — IPRATROPIUM BROMIDE AND ALBUTEROL SULFATE 2.5; .5 MG/3ML; MG/3ML
3 SOLUTION RESPIRATORY (INHALATION) EVERY 6 HOURS
Refills: 0 | Status: DISCONTINUED | OUTPATIENT
Start: 2024-08-01 | End: 2024-08-02

## 2024-08-01 RX ORDER — MULTIVITAMIN/IRON/FOLIC ACID 18MG-0.4MG
1 TABLET ORAL ONCE
Refills: 0 | Status: DISCONTINUED | OUTPATIENT
Start: 2024-08-01 | End: 2024-08-01

## 2024-08-01 RX ORDER — PANTOPRAZOLE SODIUM 20 MG/1
40 TABLET, DELAYED RELEASE ORAL ONCE
Refills: 0 | Status: COMPLETED | OUTPATIENT
Start: 2024-08-01 | End: 2024-08-01

## 2024-08-01 RX ORDER — GINGER ROOT/GINGER ROOT EXT 262.5 MG
100 CAPSULE ORAL DAILY
Refills: 0 | Status: DISCONTINUED | OUTPATIENT
Start: 2024-08-01 | End: 2024-08-02

## 2024-08-01 RX ORDER — MAGNESIUM, ALUMINUM HYDROXIDE 200-225/5
30 SUSPENSION, ORAL (FINAL DOSE FORM) ORAL EVERY 4 HOURS
Refills: 0 | Status: DISCONTINUED | OUTPATIENT
Start: 2024-08-01 | End: 2024-08-02

## 2024-08-01 RX ORDER — LORAZEPAM 1 MG/1
2 TABLET ORAL
Refills: 0 | Status: DISCONTINUED | OUTPATIENT
Start: 2024-08-01 | End: 2024-08-01

## 2024-08-01 RX ORDER — CYANOCOBALAMIN/FOLIC AC/VIT B6 2-2.5-25MG
1 TABLET ORAL DAILY
Refills: 0 | Status: DISCONTINUED | OUTPATIENT
Start: 2024-08-01 | End: 2024-08-02

## 2024-08-01 RX ORDER — LORAZEPAM 1 MG/1
2 TABLET ORAL ONCE
Refills: 0 | Status: DISCONTINUED | OUTPATIENT
Start: 2024-08-01 | End: 2024-08-01

## 2024-08-01 RX ORDER — MULTIVITAMIN/IRON/FOLIC ACID 18MG-0.4MG
1 TABLET ORAL DAILY
Refills: 0 | Status: DISCONTINUED | OUTPATIENT
Start: 2024-08-01 | End: 2024-08-02

## 2024-08-01 RX ADMIN — LORAZEPAM 2 MILLIGRAM(S): 1 TABLET ORAL at 05:25

## 2024-08-01 RX ADMIN — Medication 1 TABLET(S): at 14:33

## 2024-08-01 RX ADMIN — IPRATROPIUM BROMIDE AND ALBUTEROL SULFATE 3 MILLILITER(S): 2.5; .5 SOLUTION RESPIRATORY (INHALATION) at 11:29

## 2024-08-01 RX ADMIN — Medication 50 MILLIGRAM(S): at 05:20

## 2024-08-01 RX ADMIN — Medication 1 MILLIGRAM(S): at 14:33

## 2024-08-01 RX ADMIN — Medication 100 MILLIGRAM(S): at 05:23

## 2024-08-01 RX ADMIN — Medication 4 MILLIGRAM(S): at 04:40

## 2024-08-01 RX ADMIN — Medication 100 MILLIGRAM(S): at 14:34

## 2024-08-01 RX ADMIN — PANTOPRAZOLE SODIUM 40 MILLIGRAM(S): 20 TABLET, DELAYED RELEASE ORAL at 05:36

## 2024-08-01 NOTE — H&P ADULT - TIME BILLING
Time spent on review of vitals, physical exam, documentation, and discussion of plan of care with patient and patient family.

## 2024-08-01 NOTE — H&P ADULT - NSHPSOCIALHISTORY_GEN_ALL_CORE
He has been for 5-6 times for about 30 years total for getbetter!. He has been out of nursing home since 2020. He lives in [place] alone. 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

## 2024-08-01 NOTE — H&P ADULT - NSHPLABSRESULTS_GEN_ALL_CORE
LABS:                         14.0   7.51  )-----------( 162      ( 31 Jul 2024 18:13 )             40.3     08-01    143  |  107  |  8   ----------------------------<  107<H>  5.0   |  17<L>  |  0.94    Ca    8.9      01 Aug 2024 05:15  Phos  3.2     08-01  Mg     2.10     08-01    TPro  6.6  /  Alb  3.8  /  TBili  0.4  /  DBili  <0.2  /  AST  153<H>  /  ALT  150<H>  /  AlkPhos  116  08-01    PT/INR - ( 01 Aug 2024 05:15 )   PT: 9.5 sec;   INR: <0.90 ratio         PTT - ( 01 Aug 2024 05:15 )  PTT:33.1 sec  Urinalysis Basic - ( 01 Aug 2024 05:15 )    Color: x / Appearance: x / SG: x / pH: x  Gluc: 107 mg/dL / Ketone: x  / Bili: x / Urobili: x   Blood: x / Protein: x / Nitrite: x   Leuk Esterase: x / RBC: x / WBC x   Sq Epi: x / Non Sq Epi: x / Bacteria: x            RADIOLOGY, EKG & ADDITIONAL TESTS: Reviewed. LABS:                         14.0   7.51  )-----------( 162      ( 31 Jul 2024 18:13 )             40.3     08-01    143  |  107  |  8   ----------------------------<  107<H>  5.0   |  17<L>  |  0.94    Ca    8.9      01 Aug 2024 05:15  Phos  3.2     08-01  Mg     2.10     08-01    TPro  6.6  /  Alb  3.8  /  TBili  0.4  /  DBili  <0.2  /  AST  153<H>  /  ALT  150<H>  /  AlkPhos  116  08-01    PT/INR - ( 01 Aug 2024 05:15 )   PT: 9.5 sec;   INR: <0.90 ratio         PTT - ( 01 Aug 2024 05:15 )  PTT:33.1 sec  Urinalysis Basic - ( 01 Aug 2024 05:15 )    Color: x / Appearance: x / SG: x / pH: x  Gluc: 107 mg/dL / Ketone: x  / Bili: x / Urobili: x   Blood: x / Protein: x / Nitrite: x   Leuk Esterase: x / RBC: x / WBC x   Sq Epi: x / Non Sq Epi: x / Bacteria: x            RADIOLOGY, EKG & ADDITIONAL TESTS: Reviewed.  CT A/P with contrast    FINDINGS:  LOWER CHEST: No consolidation or pleural effusion. Small bilateral   posterior diaphragmatic hernias containing fat.    LIVER: Diffuse hepatic steatosis.  BILE DUCTS: Normal caliber.  GALLBLADDER: Within normal limits.  SPLEEN: Within normal limits.  PANCREAS: Within normal limits.  ADRENALS: Within normal limits.  KIDNEYS/URETERS: No hydronephrosis.    BLADDER: Within normal limits.  REPRODUCTIVE ORGANS: Prostate within normal limits.    BOWEL: No bowel obstruction. Appendix is normal. Focal wall thickening of   distal portion of stomach and proximal duodenum, which may represent   gastroduodenitis. Consider nonemergent upper endoscopy to exclude   underlying ulcer or lesion. Inadequately distended distal colonic loops.  PERITONEUM/RETROPERITONEUM: Within normal limits.  VESSELS: Atherosclerotic changes.  LYMPH NODES: No lymphadenopathy.  ABDOMINAL WALL: Within normal limits.  BONES: Degenerative changes.    IMPRESSION:    Focal wall thickening of distal portion of stomach and proximal duodenum,   which may represent gastroduodenitis. Consider nonemergent upper   endoscopy to exclude underlying ulcer or lesion.    Diffuse hepatic steatosis.

## 2024-08-01 NOTE — H&P ADULT - PROBLEM SELECTOR PLAN 3
On admission AST/ALT: 173/158  Diffuse hepatic steatosis on CT A/P  likely 2/2 alcohol abuse history   LFTs are downtrending   -ctm

## 2024-08-01 NOTE — ED ADULT NURSE REASSESSMENT NOTE - NS ED NURSE REASSESS COMMENT FT1
Patient noted to be vomiting, zofran given per MD orders. Patient noted to be tremulous as well, CIWA documented in flow sheet. Respirations even and unlabored. Patient endorsing Nausea and vomiting. Stretcher in lowest position. Comfort and safety maintained. MD hassan and parris aware of patient status.

## 2024-08-01 NOTE — H&P ADULT - NSHPPHYSICALEXAM_GEN_ALL_CORE
Vital Signs Last 24 Hrs  T(C): 37 (01 Aug 2024 08:46), Max: 37.1 (01 Aug 2024 00:42)  T(F): 98.6 (01 Aug 2024 08:46), Max: 98.8 (01 Aug 2024 00:42)  HR: 88 (01 Aug 2024 09:45) (70 - 89)  BP: 142/90 (01 Aug 2024 09:45) (121/64 - 152/78)  BP(mean): --  RR: 20 (01 Aug 2024 09:45) (16 - 20)  SpO2: 99% (01 Aug 2024 09:45) (94% - 100%)    Parameters below as of 01 Aug 2024 09:45  Patient On (Oxygen Delivery Method): room air        CONSTITUTIONAL: NAD, well-developed, well-groomed  EYES: PERRLA; conjunctiva and sclera clear  ENMT: Moist oral mucosa, no pharyngeal injection or exudates; normal dentition  NECK: Supple, no palpable masses; no thyromegaly  RESPIRATORY: Normal respiratory effort; lungs are clear to auscultation bilaterally  CARDIOVASCULAR: Regular rate and rhythm, normal S1 and S2, no murmur/rub/gallop; No lower extremity edema; Peripheral pulses are 2+ bilaterally  ABDOMEN: Nontender to palpation, normoactive bowel sounds, no rebound/guarding; No hepatosplenomegaly  MUSCULOSKELETAL:   no clubbing or cyanosis of digits; no joint swelling or tenderness to palpation  PSYCH: A+O to person, place, and time; affect appropriate  NEUROLOGY: CN 2-12 are intact and symmetric; no gross sensory deficits   SKIN: No rashes; no palpable lesions Vital Signs Last 24 Hrs  T(C): 37 (01 Aug 2024 08:46), Max: 37.1 (01 Aug 2024 00:42)  T(F): 98.6 (01 Aug 2024 08:46), Max: 98.8 (01 Aug 2024 00:42)  HR: 88 (01 Aug 2024 09:45) (70 - 89)  BP: 142/90 (01 Aug 2024 09:45) (121/64 - 152/78)  BP(mean): --  RR: 20 (01 Aug 2024 09:45) (16 - 20)  SpO2: 99% (01 Aug 2024 09:45) (94% - 100%)    Parameters below as of 01 Aug 2024 09:45  Patient On (Oxygen Delivery Method): room air        CONSTITUTIONAL: NAD, well-developed, well-groomed  EYES: PERRLA; conjunctiva and sclera clear  ENMT: Moist oral mucosa, no pharyngeal injection or exudates  NECK: Supple, no palpable masses;  RESPIRATORY: Normal respiratory effort; +wheezing, no crackles or rhonchi  CARDIOVASCULAR: Regular rate and rhythm, normal S1 and S2, no murmur/rub/gallop; No lower extremity edema  ABDOMEN: Nontender to palpation, normoactive bowel sounds, no rebound/guarding  MUSCULOSKELETAL:   no clubbing or cyanosis of digits; no joint swelling or tenderness to palpation  PSYCH: A+O to person, place, and time; affect appropriate  NEUROLOGY: CN 2-12 are intact and symmetric; no gross sensory deficits, +bilateral hand tremors   SKIN: No rashes; no palpable lesions

## 2024-08-01 NOTE — H&P ADULT - NSHPREVIEWOFSYSTEMS_GEN_ALL_CORE
REVIEW OF SYSTEMS:  CONSTITUTIONAL: No weakness, fevers, chills, sick contacts, or unintended weight loss  EYES: No visual changes or vertigo  ENT: No throat pain, rhinorrhea, or hearing loss   NECK: No pain or stiffness  RESPIRATORY: No cough, wheezing, hemoptysis; No shortness of breath  CARDIOVASCULAR: No chest pain or palpitations  GASTROINTESTINAL: +left upper abdominal  pain, +nausea, +vomiting, No hematemesis; No diarrhea or constipation. No melena or hematochezia.  GENITOURINARY: No dysuria, frequency or hematuria  NEUROLOGICAL: +hand tremors, No numbness or weakness  SKIN: No itching, rashes, or bruises  Psych: Good mood, no substance use

## 2024-08-01 NOTE — CONSULT NOTE ADULT - SUBJECTIVE AND OBJECTIVE BOX
Chief Complaint:  Patient is a 58y old  Male who presents with a chief complaint of alcohol withdrawal (01 Aug 2024 10:55)    HPI: 58 yr old M with hx of asthma (not on any inhalers), incarceration, alcohol abuse, presents to the ED for one week of LUQ pain. Prior to arrival to the ED the patient drank 1 pint of vodka. He regularly drinks 1-2 pinks of vodka a day,  He developed tremors, nausea and vomiting and was admitted. CT A/P showing focal wall thickening of distal portion of stomach and proximal duodenum c/f gastroduodenitis, GI consulted for further evaluation.    pt seen and examined.    currently     Allergies:  penicillin (Unknown)      PMHX/PSHX:  No pertinent past medical history    Blind, unilateral    Chronic back pain greater than 3 months duration    Asthma    No significant past surgical history    H/O eye surgery    H/O eye surgery        Family history:  Family history unknown    No pertinent family history in first degree relatives    FH: brain cancer (Mother)    FHx: pancreatic cancer (Father)        Social History: as above    Home Medications:    Hospital Medications:  acetaminophen     Tablet .. 650 milliGRAM(s) Oral every 6 hours PRN  albuterol/ipratropium for Nebulization 3 milliLiter(s) Nebulizer every 6 hours PRN  aluminum hydroxide/magnesium hydroxide/simethicone Suspension 30 milliLiter(s) Oral every 4 hours PRN  folic acid 1 milliGRAM(s) Oral daily  LORazepam   Injectable 2 milliGRAM(s) IV Push every 1 hour PRN  LORazepam   Injectable 2 milliGRAM(s) IV Push every 2 hours PRN  multivitamin 1 Tablet(s) Oral daily  ondansetron Injectable 4 milliGRAM(s) IV Push every 8 hours PRN  thiamine 100 milliGRAM(s) Oral daily        ROS:   General:  AS PER HPI  Eyes:  Adequate vision, no reported pain  ENT:  No sore throat, runny nose, dysphagia  CV:  No chest pain, palpitations  Pulm:  No dyspnea, cough, tachypnea, wheezing  GI:  AS PER HPI  :  No pain, bleeding, burning  Muscle:  No pain, weakness  Neuro:  No tingling, numbness, memory problems  Psych:  No insomnia, mood problems, depression  Endocrine:  No polyuria, cold/heat intolerance  Heme:  No petechiae, ecchymosis, easy bruisability  Skin:  No rash, edema      Vital Signs:  Vital Signs Last 24 Hrs  T(C): 36.2 (01 Aug 2024 12:13), Max: 37.1 (01 Aug 2024 00:42)  T(F): 97.2 (01 Aug 2024 12:13), Max: 98.8 (01 Aug 2024 00:42)  HR: 81 (01 Aug 2024 12:13) (70 - 90)  BP: 135/79 (01 Aug 2024 12:13) (121/64 - 152/78)  BP(mean): --  RR: 18 (01 Aug 2024 12:13) (16 - 20)  SpO2: 98% (01 Aug 2024 12:13) (94% - 100%)    Parameters below as of 01 Aug 2024 12:13  Patient On (Oxygen Delivery Method): room air      Daily Height in cm: 172.72 (31 Jul 2024 16:09)    Daily     LABS:                        14.0   7.51  )-----------( 162      ( 31 Jul 2024 18:13 )             40.3     Mean Cell Volume: 94.4 fL (07-31-24 @ 18:13)    08-01    143  |  107  |  8   ----------------------------<  107<H>  5.0   |  17<L>  |  0.94    Ca    8.9      01 Aug 2024 05:15  Phos  3.2     08-01  Mg     2.10     08-01    TPro  6.6  /  Alb  3.8  /  TBili  0.4  /  DBili  <0.2  /  AST  153<H>  /  ALT  150<H>  /  AlkPhos  116  08-01    LIVER FUNCTIONS - ( 01 Aug 2024 05:15 )  Alb: 3.8 g/dL / Pro: 6.6 g/dL / ALK PHOS: 116 U/L / ALT: 150 U/L / AST: 153 U/L / GGT: x           PT/INR - ( 01 Aug 2024 05:15 )   PT: 9.5 sec;   INR: <0.90 ratio         PTT - ( 01 Aug 2024 05:15 )  PTT:33.1 sec  Urinalysis Basic - ( 01 Aug 2024 05:15 )    Color: x / Appearance: x / SG: x / pH: x  Gluc: 107 mg/dL / Ketone: x  / Bili: x / Urobili: x   Blood: x / Protein: x / Nitrite: x   Leuk Esterase: x / RBC: x / WBC x   Sq Epi: x / Non Sq Epi: x / Bacteria: x      Amylase Serum--      Lipase [id number]       Ammonia--                          14.0   7.51  )-----------( 162      ( 31 Jul 2024 18:13 )             40.3       PHYSICAL EXAM:   GENERAL:  Lying in bed in NAD  HEENT:  Normocephalic/atraumatic, no scleral icterus  NECK: Trachea midline  CHEST:  Resp even, non labored   ABDOMEN:  Soft, non-tender, non-distended  EXTREMITIES: WWP, no edema  SKIN:  No rash or jaundice  NEURO:  Alert and oriented x 3, no asterixis    Imaging:    ACC: [id number] EXAM:  CT ABDOMEN AND PELVIS IC   ORDERED BY: RUDI JOHN     PROCEDURE DATE:  08/01/2024          INTERPRETATION:  CLINICAL INFORMATION: Epigastric and left upper quadrant   pain. Elevated LFTs.    COMPARISON: None.    CONTRAST/COMPLICATIONS:  IV Contrast: Omnipaque 350  90 cc administered   10 cc discarded  Oral Contrast: NONE  Complications: None reported at time of study completion    PROCEDURE:  CT of the Abdomen and Pelvis was performed.  Sagittal and coronal reformats wereperformed.    FINDINGS:  LOWER CHEST: No consolidation or pleural effusion. Small bilateral   posterior diaphragmatic hernias containing fat.    LIVER: Diffuse hepatic steatosis.  BILE DUCTS: Normal caliber.  GALLBLADDER: Within normal limits.  SPLEEN: Within normal limits.  PANCREAS: Within normal limits.  ADRENALS: Within normal limits.  KIDNEYS/URETERS: No hydronephrosis.    BLADDER: Within normal limits.  REPRODUCTIVE ORGANS: Prostate within normal limits.    BOWEL: No bowel obstruction. Appendix is normal. Focal wall thickening of   distal portion of stomach and proximal duodenum, which may represent   gastroduodenitis. Consider nonemergent upper endoscopy to exclude   underlying ulcer or lesion. Inadequately distended distal colonic loops.  PERITONEUM/RETROPERITONEUM: Within normal limits.  VESSELS: Atherosclerotic changes.  LYMPH NODES: No lymphadenopathy.  ABDOMINAL WALL: Within normal limits.  BONES: Degenerative changes.    IMPRESSION:    Focal wall thickening of distal portion of stomach and proximal duodenum,   which may represent gastroduodenitis. Consider nonemergent upper   endoscopy to exclude underlying ulcer or lesion.    Diffuse hepatic steatosis.    --- End of Report ---           Chief Complaint:  Patient is a 58y old  Male who presents with a chief complaint of alcohol withdrawal (01 Aug 2024 10:55)    HPI: 58 yr old M with hx of asthma (not on any inhalers), incarceration, alcohol abuse, presents to the ED for one week of LUQ pain. Prior to arrival to the ED the patient drank 1 pint of vodka. He regularly drinks 1-2 pinks of vodka a day,  He developed tremors, nausea and vomiting and was admitted. CT A/P showing focal wall thickening of distal portion of stomach and proximal duodenum c/f gastroduodenitis and diffuse hepatic steatosis, GI consulted for further evaluation.    pt seen and examined.    currently avss  no leukocytosis, hgb wnl, plts 162k, inr .9, bicarb 17, ast 173-- 153, alt 158-- 150, lipase 128, lactate wnl, ctap as above, ciwas 4-5    Allergies:  penicillin (Unknown)      PMHX/PSHX:    Blind, unilateral    Chronic back pain greater than 3 months duration    Asthma    H/O eye surgery        Family history:  FHx: pancreatic cancer (Father)    Social History: as above    Home Medications: as per h&p    Hospital Medications:  acetaminophen     Tablet .. 650 milliGRAM(s) Oral every 6 hours PRN  albuterol/ipratropium for Nebulization 3 milliLiter(s) Nebulizer every 6 hours PRN  aluminum hydroxide/magnesium hydroxide/simethicone Suspension 30 milliLiter(s) Oral every 4 hours PRN  folic acid 1 milliGRAM(s) Oral daily  LORazepam   Injectable 2 milliGRAM(s) IV Push every 1 hour PRN  LORazepam   Injectable 2 milliGRAM(s) IV Push every 2 hours PRN  multivitamin 1 Tablet(s) Oral daily  ondansetron Injectable 4 milliGRAM(s) IV Push every 8 hours PRN  thiamine 100 milliGRAM(s) Oral daily        ROS:   General:  AS PER HPI  Eyes:  Adequate vision, no reported pain  ENT:  No sore throat, runny nose, dysphagia  CV:  No chest pain, palpitations  Pulm:  No dyspnea, cough, tachypnea, wheezing  GI:  AS PER HPI  :  No pain, bleeding, burning  Muscle:  No pain, weakness  Neuro:  as per hpi  Psych:  No insomnia  Endocrine:  No polyuria, cold/heat intolerance  Heme:  No petechiae, ecchymosis, easy bruisability  Skin:  No rash, edema      Vital Signs:  Vital Signs Last 24 Hrs  T(C): 36.2 (01 Aug 2024 12:13), Max: 37.1 (01 Aug 2024 00:42)  T(F): 97.2 (01 Aug 2024 12:13), Max: 98.8 (01 Aug 2024 00:42)  HR: 81 (01 Aug 2024 12:13) (70 - 90)  BP: 135/79 (01 Aug 2024 12:13) (121/64 - 152/78)  BP(mean): --  RR: 18 (01 Aug 2024 12:13) (16 - 20)  SpO2: 98% (01 Aug 2024 12:13) (94% - 100%)    Parameters below as of 01 Aug 2024 12:13  Patient On (Oxygen Delivery Method): room air      Daily Height in cm: 172.72 (31 Jul 2024 16:09)    Daily     LABS:                        14.0   7.51  )-----------( 162      ( 31 Jul 2024 18:13 )             40.3     Mean Cell Volume: 94.4 fL (07-31-24 @ 18:13)    08-01    143  |  107  |  8   ----------------------------<  107<H>  5.0   |  17<L>  |  0.94    Ca    8.9      01 Aug 2024 05:15  Phos  3.2     08-01  Mg     2.10     08-01    TPro  6.6  /  Alb  3.8  /  TBili  0.4  /  DBili  <0.2  /  AST  153<H>  /  ALT  150<H>  /  AlkPhos  116  08-01    LIVER FUNCTIONS - ( 01 Aug 2024 05:15 )  Alb: 3.8 g/dL / Pro: 6.6 g/dL / ALK PHOS: 116 U/L / ALT: 150 U/L / AST: 153 U/L / GGT: x           PT/INR - ( 01 Aug 2024 05:15 )   PT: 9.5 sec;   INR: <0.90 ratio         PTT - ( 01 Aug 2024 05:15 )  PTT:33.1 sec  Urinalysis Basic - ( 01 Aug 2024 05:15 )    Color: x / Appearance: x / SG: x / pH: x  Gluc: 107 mg/dL / Ketone: x  / Bili: x / Urobili: x   Blood: x / Protein: x / Nitrite: x   Leuk Esterase: x / RBC: x / WBC x   Sq Epi: x / Non Sq Epi: x / Bacteria: x      Amylase Serum--      Lipase [id number]       Ammonia--                          14.0   7.51  )-----------( 162      ( 31 Jul 2024 18:13 )             40.3       PHYSICAL EXAM:               Imaging:    ACC: [id number] EXAM:  CT ABDOMEN AND PELVIS IC   ORDERED BY: RUDI JOHN     PROCEDURE DATE:  08/01/2024          INTERPRETATION:  CLINICAL INFORMATION: Epigastric and left upper quadrant   pain. Elevated LFTs.    COMPARISON: None.    CONTRAST/COMPLICATIONS:  IV Contrast: Omnipaque 350  90 cc administered   10 cc discarded  Oral Contrast: NONE  Complications: None reported at time of study completion    PROCEDURE:  CT of the Abdomen and Pelvis was performed.  Sagittal and coronal reformats wereperformed.    FINDINGS:  LOWER CHEST: No consolidation or pleural effusion. Small bilateral   posterior diaphragmatic hernias containing fat.    LIVER: Diffuse hepatic steatosis.  BILE DUCTS: Normal caliber.  GALLBLADDER: Within normal limits.  SPLEEN: Within normal limits.  PANCREAS: Within normal limits.  ADRENALS: Within normal limits.  KIDNEYS/URETERS: No hydronephrosis.    BLADDER: Within normal limits.  REPRODUCTIVE ORGANS: Prostate within normal limits.    BOWEL: No bowel obstruction. Appendix is normal. Focal wall thickening of   distal portion of stomach and proximal duodenum, which may represent   gastroduodenitis. Consider nonemergent upper endoscopy to exclude   underlying ulcer or lesion. Inadequately distended distal colonic loops.  PERITONEUM/RETROPERITONEUM: Within normal limits.  VESSELS: Atherosclerotic changes.  LYMPH NODES: No lymphadenopathy.  ABDOMINAL WALL: Within normal limits.  BONES: Degenerative changes.    IMPRESSION:    Focal wall thickening of distal portion of stomach and proximal duodenum,   which may represent gastroduodenitis. Consider nonemergent upper   endoscopy to exclude underlying ulcer or lesion.    Diffuse hepatic steatosis.    --- End of Report ---           Chief Complaint:  Patient is a 58y old  Male who presents with a chief complaint of alcohol withdrawal (01 Aug 2024 10:55)    HPI: 58 yr old M with hx of asthma (not on any inhalers), incarceration, alcohol abuse, presents to the ED for LUQ pain. Was initially planned for dc but then developed signs of withdrawal and was admitted. CT A/P showing focal wall thickening of distal portion of stomach and proximal duodenum c/f gastroduodenitis and diffuse hepatic steatosis, GI consulted for further evaluation.    pt seen and examined. states came to hospital for L sided pain along his rib cage for the past few weeks, worsened when coughing, at times will have non bloody episodes of vomiting associated with coughing, denies vomiting if not coughing. states he does not have 'belly' pain. able to tolerate po (eating chicken on bone and mashed potatoes at time of eval). denies any trauma or recent falls. no recent sick contacts, travel, consumption of suspicious foods. denies f/c, dysphagia/odynophagia, nausea, hematemesis, diarrhea, constipation, melena, brbpr. had colon when he was 23yo, unclear indication, reportedly normal. no prior egd. no prior abd sx. denies ap, ac, nsaid use. fhx- father w/ pancreatic cancer in his 50s. smokes 1ppd, drinks 1-2 pints of vodka daily, last drink yesterday AM.     currently avss  no leukocytosis, hgb wnl, plts 162k, inr .9, bicarb 17, ast 173-- 153, alt 158-- 150, lipase 128, lactate wnl, ctap as above, ciwas 4-5    Allergies:  penicillin (Unknown)      PMHX/PSHX:    Blind, unilateral    Chronic back pain greater than 3 months duration    Asthma    H/O eye surgery    Family history:  FHx: pancreatic cancer (Father)    Social History: as above    Home Medications: as per h&p    Hospital Medications:  acetaminophen     Tablet .. 650 milliGRAM(s) Oral every 6 hours PRN  albuterol/ipratropium for Nebulization 3 milliLiter(s) Nebulizer every 6 hours PRN  aluminum hydroxide/magnesium hydroxide/simethicone Suspension 30 milliLiter(s) Oral every 4 hours PRN  folic acid 1 milliGRAM(s) Oral daily  LORazepam   Injectable 2 milliGRAM(s) IV Push every 1 hour PRN  LORazepam   Injectable 2 milliGRAM(s) IV Push every 2 hours PRN  multivitamin 1 Tablet(s) Oral daily  ondansetron Injectable 4 milliGRAM(s) IV Push every 8 hours PRN  thiamine 100 milliGRAM(s) Oral daily        ROS:   General:  AS PER HPI  Eyes:  Adequate vision, no reported pain  ENT:  No sore throat, runny nose, dysphagia  CV:  No chest pain, palpitations  Pulm:  No dyspnea, cough, tachypnea, wheezing  GI:  AS PER HPI  :  No pain, bleeding, burning  Muscle:  No pain, weakness  Neuro:  as per hpi  Psych:  No insomnia  Endocrine:  No polyuria, cold/heat intolerance  Heme:  No petechiae, ecchymosis, easy bruisability  Skin:  No rash, edema      Vital Signs:  Vital Signs Last 24 Hrs  T(C): 36.2 (01 Aug 2024 12:13), Max: 37.1 (01 Aug 2024 00:42)  T(F): 97.2 (01 Aug 2024 12:13), Max: 98.8 (01 Aug 2024 00:42)  HR: 81 (01 Aug 2024 12:13) (70 - 90)  BP: 135/79 (01 Aug 2024 12:13) (121/64 - 152/78)  BP(mean): --  RR: 18 (01 Aug 2024 12:13) (16 - 20)  SpO2: 98% (01 Aug 2024 12:13) (94% - 100%)    Parameters below as of 01 Aug 2024 12:13  Patient On (Oxygen Delivery Method): room air      Daily Height in cm: 172.72 (31 Jul 2024 16:09)    Daily     LABS:                        14.0   7.51  )-----------( 162      ( 31 Jul 2024 18:13 )             40.3     Mean Cell Volume: 94.4 fL (07-31-24 @ 18:13)    08-01    143  |  107  |  8   ----------------------------<  107<H>  5.0   |  17<L>  |  0.94    Ca    8.9      01 Aug 2024 05:15  Phos  3.2     08-01  Mg     2.10     08-01    TPro  6.6  /  Alb  3.8  /  TBili  0.4  /  DBili  <0.2  /  AST  153<H>  /  ALT  150<H>  /  AlkPhos  116  08-01    LIVER FUNCTIONS - ( 01 Aug 2024 05:15 )  Alb: 3.8 g/dL / Pro: 6.6 g/dL / ALK PHOS: 116 U/L / ALT: 150 U/L / AST: 153 U/L / GGT: x           PT/INR - ( 01 Aug 2024 05:15 )   PT: 9.5 sec;   INR: <0.90 ratio         PTT - ( 01 Aug 2024 05:15 )  PTT:33.1 sec  Urinalysis Basic - ( 01 Aug 2024 05:15 )    Color: x / Appearance: x / SG: x / pH: x  Gluc: 107 mg/dL / Ketone: x  / Bili: x / Urobili: x   Blood: x / Protein: x / Nitrite: x   Leuk Esterase: x / RBC: x / WBC x   Sq Epi: x / Non Sq Epi: x / Bacteria: x      Amylase Serum--      Lipase [id number]       Ammonia--                          14.0   7.51  )-----------( 162      ( 31 Jul 2024 18:13 )             40.3       PHYSICAL EXAM:   GENERAL: lying in bed, in no apparent distress  HEENT: NCAT, poor dentition  NECK: trachea midline  CV: s1s2  CHEST:  respirations even, non labored  ABDOMEN:  softly distended, mild epigastric ttp and ttp along L sided rib cage  EXTREMITIES: WWP  SKIN:  warm/dry  PSYCH:  A&O x 3  NEURO: +tremor      Imaging:    ACC: [id number] EXAM:  CT ABDOMEN AND PELVIS IC   ORDERED BY: RUDI JOHN     PROCEDURE DATE:  08/01/2024          INTERPRETATION:  CLINICAL INFORMATION: Epigastric and left upper quadrant   pain. Elevated LFTs.    COMPARISON: None.    CONTRAST/COMPLICATIONS:  IV Contrast: Omnipaque 350  90 cc administered   10 cc discarded  Oral Contrast: NONE  Complications: None reported at time of study completion    PROCEDURE:  CT of the Abdomen and Pelvis was performed.  Sagittal and coronal reformats wereperformed.    FINDINGS:  LOWER CHEST: No consolidation or pleural effusion. Small bilateral   posterior diaphragmatic hernias containing fat.    LIVER: Diffuse hepatic steatosis.  BILE DUCTS: Normal caliber.  GALLBLADDER: Within normal limits.  SPLEEN: Within normal limits.  PANCREAS: Within normal limits.  ADRENALS: Within normal limits.  KIDNEYS/URETERS: No hydronephrosis.    BLADDER: Within normal limits.  REPRODUCTIVE ORGANS: Prostate within normal limits.    BOWEL: No bowel obstruction. Appendix is normal. Focal wall thickening of   distal portion of stomach and proximal duodenum, which may represent   gastroduodenitis. Consider nonemergent upper endoscopy to exclude   underlying ulcer or lesion. Inadequately distended distal colonic loops.  PERITONEUM/RETROPERITONEUM: Within normal limits.  VESSELS: Atherosclerotic changes.  LYMPH NODES: No lymphadenopathy.  ABDOMINAL WALL: Within normal limits.  BONES: Degenerative changes.    IMPRESSION:    Focal wall thickening of distal portion of stomach and proximal duodenum,   which may represent gastroduodenitis. Consider nonemergent upper   endoscopy to exclude underlying ulcer or lesion.    Diffuse hepatic steatosis.    --- End of Report ---           Chief Complaint:  Patient is a 58y old  Male who presents with a chief complaint of alcohol withdrawal (01 Aug 2024 10:55)    HPI: 58 yr old M with hx of asthma (not on any inhalers), incarceration, alcohol abuse, presents to the ED for LUQ pain. Was initially planned for dc but then developed signs of withdrawal and was admitted. CT A/P showing focal wall thickening of distal portion of stomach and proximal duodenum c/f gastroduodenitis and diffuse hepatic steatosis, GI consulted for further evaluation.    pt seen and examined. states came to hospital for L sided pain along his rib cage for the past few weeks, worsened when coughing, at times will have non bloody episodes of vomiting associated with coughing, denies vomiting if not coughing. states he does not have 'belly' pain. able to tolerate po (eating chicken on bone and mashed potatoes at time of eval). denies any trauma or recent falls. no recent sick contacts, travel, consumption of suspicious foods. denies f/c, dysphagia/odynophagia, nausea, hematemesis, diarrhea, constipation, melena, brbpr. had colon when he was 23yo, unclear indication, reportedly normal. no prior egd. no prior abd sx. denies ap, ac, nsaid use. fhx- father w/ pancreatic cancer in his 50s. smokes 1ppd, drinks 1-2 pints of vodka daily, last drink yesterday AM.     currently avss  no leukocytosis, hgb wnl, plts 162k, inr .9, bicarb 17, ast 173-- 153, alt 158-- 150, lipase 128, lactate wnl, ctap as above, ciwas 4-5    Allergies:  penicillin (Unknown)      PMHX/PSHX:    Blind, unilateral  Chronic back pain greater than 3 months duration  Asthma  H/O eye surgery    Family history:  FHx: pancreatic cancer (Father)    Social History: as above    Home Medications: as per h&p    Hospital Medications:  acetaminophen     Tablet .. 650 milliGRAM(s) Oral every 6 hours PRN  albuterol/ipratropium for Nebulization 3 milliLiter(s) Nebulizer every 6 hours PRN  aluminum hydroxide/magnesium hydroxide/simethicone Suspension 30 milliLiter(s) Oral every 4 hours PRN  folic acid 1 milliGRAM(s) Oral daily  LORazepam   Injectable 2 milliGRAM(s) IV Push every 1 hour PRN  LORazepam   Injectable 2 milliGRAM(s) IV Push every 2 hours PRN  multivitamin 1 Tablet(s) Oral daily  ondansetron Injectable 4 milliGRAM(s) IV Push every 8 hours PRN  thiamine 100 milliGRAM(s) Oral daily        ROS:   General:  AS PER HPI  Eyes:  Adequate vision, no reported pain  ENT:  No sore throat, runny nose, dysphagia  CV:  No chest pain, palpitations  Pulm:  No dyspnea, cough, tachypnea, wheezing  GI:  AS PER HPI  :  No pain, bleeding, burning  Muscle:  No pain, weakness  Neuro:  as per hpi  Psych:  No insomnia  Endocrine:  No polyuria, cold/heat intolerance  Heme:  No petechiae, ecchymosis, easy bruisability  Skin:  No rash, edema      Vital Signs:  Vital Signs Last 24 Hrs  T(C): 36.2 (01 Aug 2024 12:13), Max: 37.1 (01 Aug 2024 00:42)  T(F): 97.2 (01 Aug 2024 12:13), Max: 98.8 (01 Aug 2024 00:42)  HR: 81 (01 Aug 2024 12:13) (70 - 90)  BP: 135/79 (01 Aug 2024 12:13) (121/64 - 152/78)  BP(mean): --  RR: 18 (01 Aug 2024 12:13) (16 - 20)  SpO2: 98% (01 Aug 2024 12:13) (94% - 100%)    Parameters below as of 01 Aug 2024 12:13  Patient On (Oxygen Delivery Method): room air      Daily Height in cm: 172.72 (31 Jul 2024 16:09)    Daily     PHYSICAL EXAM:   GENERAL: lying in bed, in no apparent distress  HEENT: NCAT, poor dentition  NECK: trachea midline  CV: s1s2  CHEST:  respirations even, non labored  ABDOMEN:  softly distended, mild epigastric ttp and ttp along L sided rib cage  EXTREMITIES: WWP  SKIN:  warm/dry  PSYCH:  A&O x 3  NEURO: +tremor    LABS:                        14.0   7.51  )-----------( 162      ( 31 Jul 2024 18:13 )             40.3     Mean Cell Volume: 94.4 fL (07-31-24 @ 18:13)    08-01    143  |  107  |  8   ----------------------------<  107<H>  5.0   |  17<L>  |  0.94    Ca    8.9      01 Aug 2024 05:15  Phos  3.2     08-01  Mg     2.10     08-01    TPro  6.6  /  Alb  3.8  /  TBili  0.4  /  DBili  <0.2  /  AST  153<H>  /  ALT  150<H>  /  AlkPhos  116  08-01    LIVER FUNCTIONS - ( 01 Aug 2024 05:15 )  Alb: 3.8 g/dL / Pro: 6.6 g/dL / ALK PHOS: 116 U/L / ALT: 150 U/L / AST: 153 U/L / GGT: x           PT/INR - ( 01 Aug 2024 05:15 )   PT: 9.5 sec;   INR: <0.90 ratio         PTT - ( 01 Aug 2024 05:15 )  PTT:33.1 sec  Urinalysis Basic - ( 01 Aug 2024 05:15 )    Color: x / Appearance: x / SG: x / pH: x  Gluc: 107 mg/dL / Ketone: x  / Bili: x / Urobili: x   Blood: x / Protein: x / Nitrite: x   Leuk Esterase: x / RBC: x / WBC x   Sq Epi: x / Non Sq Epi: x / Bacteria: x      Amylase Serum--      Lipase [id number]       Ammonia--                          14.0   7.51  )-----------( 162      ( 31 Jul 2024 18:13 )             40.3             Imaging:    ACC: [id number] EXAM:  CT ABDOMEN AND PELVIS IC   ORDERED BY: RUDI JOHN     PROCEDURE DATE:  08/01/2024          INTERPRETATION:  CLINICAL INFORMATION: Epigastric and left upper quadrant   pain. Elevated LFTs.    COMPARISON: None.    CONTRAST/COMPLICATIONS:  IV Contrast: Omnipaque 350  90 cc administered   10 cc discarded  Oral Contrast: NONE  Complications: None reported at time of study completion    PROCEDURE:  CT of the Abdomen and Pelvis was performed.  Sagittal and coronal reformats wereperformed.    FINDINGS:  LOWER CHEST: No consolidation or pleural effusion. Small bilateral   posterior diaphragmatic hernias containing fat.    LIVER: Diffuse hepatic steatosis.  BILE DUCTS: Normal caliber.  GALLBLADDER: Within normal limits.  SPLEEN: Within normal limits.  PANCREAS: Within normal limits.  ADRENALS: Within normal limits.  KIDNEYS/URETERS: No hydronephrosis.    BLADDER: Within normal limits.  REPRODUCTIVE ORGANS: Prostate within normal limits.    BOWEL: No bowel obstruction. Appendix is normal. Focal wall thickening of   distal portion of stomach and proximal duodenum, which may represent   gastroduodenitis. Consider nonemergent upper endoscopy to exclude   underlying ulcer or lesion. Inadequately distended distal colonic loops.  PERITONEUM/RETROPERITONEUM: Within normal limits.  VESSELS: Atherosclerotic changes.  LYMPH NODES: No lymphadenopathy.  ABDOMINAL WALL: Within normal limits.  BONES: Degenerative changes.    IMPRESSION:    Focal wall thickening of distal portion of stomach and proximal duodenum,   which may represent gastroduodenitis. Consider nonemergent upper   endoscopy to exclude underlying ulcer or lesion.    Diffuse hepatic steatosis.    --- End of Report ---

## 2024-08-01 NOTE — CONSULT NOTE ADULT - NS ATTEND AMEND GEN_ALL_CORE FT
59 yo Male h/o alcohol use disorder p/w abdominal pain, admitted for withdrawal concerns.  CT A/P wth thickening of antrum and duodenum and steatosis.  Labs with elevated liver enzymes.  Suspect findings secondary to alcohol use.  Continue treatment for alcohol withdrawal.  Diet as tolerated.  Follow up with GI as outpatient for consideration for EGD.

## 2024-08-01 NOTE — CONSULT NOTE ADULT - ASSESSMENT
58 yr old M with hx of asthma,, alcohol abuse, presents to the ED for one week of LUQ pain. Prior to arrival to the ED the patient drank 1 pint of vodka, regularly drinks 1-2 pinks of vodka a day and developed tremors, nausea and vomiting and was admitted. CT A/P showing focal wall thickening of distal portion of stomach and proximal duodenum c/f gastroduodenitis and diffuse hepatic steatosis, GI consulted for further evaluation.    HDS. Labs: no leukocytosis, hgb wnl, plts 162k, inr 0.9, bicarb 17, ast 173-- 153, alt 158-- 150, lipase 128, lactate wnl, CTAP as above, ciwas 4-5    # LUQ abdominal pain x 1 wk    # elevated lfts, hepatocellular pattern  # AUD w/ withdrawal sp librium x1 in ED     58 yr old M with hx of asthma,, alcohol abuse, presents to the ED for LUQ pain. Was initially planned for dc  but then developed signs of withdrawal and was admitted. CT A/P showing focal wall thickening of distal portion of stomach and proximal duodenum c/f gastroduodenitis and diffuse hepatic steatosis, GI consulted for further evaluation.    HDS. Labs: no leukocytosis, hgb wnl, plts 162k, inr 0.9, bicarb 17, ast 173-- 153, alt 158-- 150, lipase 128, lactate wnl, CTAP as above, ciwas 4-5    # epigastric pain and pain along L ribcage   # gastroduodenitis on imaging   - c/o L sided pain along rib cage which radiates up the neck x past few weeks, worsened when coughing, at times associated w/ episodes of non bloody vomiting, denies overt abd pain though w/ some mild ttp in epigastrium on exam, no dysphagia, no blood in stool, no nsaid use, able to tolerate po, active smoker/etoh use, imaging as above- pancreas normal appearing, findings of +gastroduodenitis, ddx includes alcoholic gastritis, h pylori, pud, etc  # elevated lfts, hepatocellular pattern/hepatic steatosis on imaging  - likely 2/2 etoh use, no evidence of synthetic liver dysfxn  # AUD w/ withdrawal sp librium x1 in ED    Recommendations-    58 yr old M with hx of asthma,, alcohol abuse, presents to the ED for LUQ pain. Was initially planned for dc  but then developed signs of withdrawal and was admitted. CT A/P showing focal wall thickening of distal portion of stomach and proximal duodenum c/f gastroduodenitis and diffuse hepatic steatosis, GI consulted for further evaluation.    HDS. Labs: no leukocytosis, hgb wnl, plts 162k, inr 0.9, bicarb 17, ast 173-- 153, alt 158-- 150, lipase 128, lactate wnl, CTAP as above, ciwas 4-5    # epigastric pain and pain along L ribcage   # gastroduodenitis on imaging   - c/o L sided pain along rib cage which radiates up the neck x past few weeks, worsened when coughing, at times associated w/ episodes of non bloody vomiting, denies overt abd pain though w/ some mild ttp in epigastrium on exam, no dysphagia, no blood in stool, no nsaid use, able to tolerate po, active smoker/etoh use, imaging as above- pancreas normal appearing, findings of +gastroduodenitis, ddx includes alcoholic gastritis, h pylori, pud, etc  # elevated lfts, hepatocellular pattern/hepatic steatosis on imaging  - likely 2/2 etoh use, no evidence of synthetic liver dysfxn, currently downtrending  # AUD w/ withdrawal sp librium x1 in ED    Recommendations-    58 yr old M with hx of asthma,, alcohol abuse, presents to the ED for LUQ pain. Was initially planned for dc  but then developed signs of withdrawal and was admitted. CT A/P showing focal wall thickening of distal portion of stomach and proximal duodenum c/f gastroduodenitis and diffuse hepatic steatosis, GI consulted for further evaluation.    HDS. Labs: no leukocytosis, hgb wnl, plts 162k, inr 0.9, bicarb 17, ast 173-- 153, alt 158-- 150, lipase 128, lactate wnl, CTAP as above, ciwas 4-5    # epigastric pain and pain along L ribcage   # gastroduodenitis on imaging   - c/o L sided pain along rib cage which radiates up the neck x past few weeks, worsened when coughing, at times associated w/ episodes of non bloody vomiting, denies overt abd pain though w/ some mild ttp in epigastrium on exam, no dysphagia, no blood in stool, no nsaid use, able to tolerate po, active smoker/etoh use, imaging as above- pancreas normal appearing, findings of +gastroduodenitis, suspect likely alcoholic gastritis (addtl ddx include pud, h pylori, etc)  # elevated lfts, hepatocellular pattern/hepatic steatosis on imaging  - likely 2/2 etoh use, no evidence of synthetic liver dysfxn, currently downtrending  # AUD w/ withdrawal sp librium x1 in ED    Recommendations-   - given active withdrawal no present plans for EGD, pt can follow up with GI as OP for EGD  - start PPI daily, maalox prn; avoid nsaids  - CIWA as per primary team  - check acute hep panel  - trend LFTs/INR  - etoh cessation  - rest of care per primary team    Please provide patient with Gastroenterology Clinic number upon discharge for scheduling of outpatient follow up appointment: [facility] at [address] [facility], [phone]     tra gi attg  GI to sign off, reach out with questions    KAYKAY Barrios PA-C, RD, CDN  available on TEAMS M/T/TH/F from 7am - 4pm    after hours/weekends: non urgent issues --> email [email or web address], urgent issues --> contact on-call GI fellow at [phone]

## 2024-08-01 NOTE — H&P ADULT - PROBLEM SELECTOR PLAN 1
hx of alcohol abuse, patient drinks 1-2 pints of vodka daily  denies hx of hospitalization for withdrawal  no history of seizures   tremulous on exam   CIWA: 8 -->4  s/p Libirum 50 x1   -CIWA  -Symptom triggered   -thiamine  -folate   -multivitamin hx of alcohol abuse, patient drinks 1-2 pints of vodka daily  denies hx of hospitalization for withdrawal  no history of seizures   tremulous on exam   CIWA: 8 -->4  s/p Libirum 50 x1   -CIWA  -Symptom triggered   -thiamine  -folate   -multivitamin  - consult

## 2024-08-01 NOTE — H&P ADULT - ASSESSMENT
58 yr old M with hx of asthma (not on any inhalers), incarceration,  alcohol abuse presents to the ED for one week of LUQ pain. Admitted for alcohol withdrawal

## 2024-08-01 NOTE — SBIRT NOTE ADULT - NSSBIRTBRIEFINTDET_GEN_A_CORE
Screening results were reviewed with the patient. Patient was provided educational materials on low-risk guidelines and substance use and health. Motivation and goals were discussed. Patient provided with Remote SBIRT information. Patient provided with informational flyer on text messaging program for alcohol use. Patient's substance use will be addressed during their inpatient admission.

## 2024-08-01 NOTE — H&P ADULT - PROBLEM SELECTOR PLAN 5
DVT: SCDs   Diet: DASH  Dispo: pending clinical improvement DVT: SCDs   Diet: regular  Dispo: pending clinical improvement

## 2024-08-01 NOTE — H&P ADULT - HISTORY OF PRESENT ILLNESS
[age] yr old M with hx of asthma (not on any inhalers), incarceration, alcohol abuse presents to the ED for one week of LUQ pain. Patient states that for the past week he has been experiencing intermittent severe episodes of LUQ/abdominal abdominal pain that radiates up his chest to the left side of his neck. At its worst the pain is a 9/10 and it is worse with moving our coughing. Denies alleviating factors. Prior to arrival to the ED the patient drank 1 pint of vodka. He regularly drinks 1-2 pinks of vodka a day,  He developed tremors, nausea and vomiting and was admitted.     In the ED initial vitals T: 98.2, HR:70 BP:134/76 RR:16. CT A/P showed focal wall thickening of distal portion of stomach and proximal duodenum c/f gastroduodenitis. He received Librium x1.       58 yr old M with hx of asthma (not on any inhalers), incarceration,  alcohol abuse presents to the ED for one week of LUQ pain. Patient states that for the past week he has been experiencing intermittent severe episodes of LUQ/abdominal abdominal pain that radiates up his chest to the left side of his neck. At its worst the pain is a 9/10 and it is worse with moving our coughing. Denies alleviating factors. Prior to arrival to the ED the patient drank 1 pint of vodka. He regularly drinks 1-2 pinks of vodka a day,  He developed tremors, nausea and vomiting and was admitted.     In the ED initial vitals T: 98.2, HR:70 BP:134/76 RR:16. CT A/P showed focal wall thickening of distal portion of stomach and proximal duodenum c/f gastroduodenitis. He received Librium x1.       58 yr old M with hx of asthma (not on any inhalers), incarceration,  alcohol abuse presents to the ED for one week of LUQ pain. Patient states that for the past week he has been experiencing intermittent severe episodes of LUQ/abdominal abdominal pain that radiates up his chest to the left side of his neck. At its worst the pain is a 9/10 and it is worse with moving our coughing. Denies alleviating factors. Prior to arrival to the ED the patient drank 1 pint of vodka. He regularly drinks 1-2 pinks of vodka a day,  He developed tremors, nausea and vomiting and was admitted. He endorses a history of alcohol withdrawal in rehab. He denies any history of withdrawal seizure or hospitalization for alcohol withdrawal.     In the ED initial vitals T: 98.2, HR:70 BP:134/76 RR:16. CT A/P showed focal wall thickening of distal portion of stomach and proximal duodenum c/f gastroduodenitis. He received Librium x1.

## 2024-08-01 NOTE — H&P ADULT - PROBLEM SELECTOR PLAN 2
one week of LUQ  abdominal pain   worse with moving or breathing   CT A/P: Focal wall thickening of distal portion of stomach and proximal duodenum, which may represent gastroduodenitis.   -outpatient GI endoscopy   -pain control one week of LUQ  abdominal pain   worse with moving or breathing   CT A/P: Focal wall thickening of distal portion of stomach and proximal duodenum, which may represent gastroduodenitis.   -will reach out to GI to discuss inpatient vs outpatient endoscopy   -pain control one week of LUQ  abdominal pain   still endorses episodes of severe LUQ pain   worse with moving or breathing   CT A/P: Focal wall thickening of distal portion of stomach and proximal duodenum, which may represent gastroduodenitis.   -will reach out to GI to discuss inpatient vs outpatient endoscopy   -pain control

## 2024-08-01 NOTE — ED ADULT NURSE REASSESSMENT NOTE - NS ED NURSE REASSESS COMMENT FT1
break coverage rn. received report from REENA gregory. pt A&Ox4, vitally stable. endorsing partial relief of abdominal pain post medicating. awaiting CT scan. safety maintained. denies other medical complaints break coverage rn. received report from REENA gregory. pt A&Ox4, vitally stable except noted to be 90% on room air pt placed on 3L NC MD Cm made aware. endorsing partial relief of abdominal pain post medicating, but pain persists. awaiting CT scan. safety maintained. denies other medical complaints

## 2024-08-01 NOTE — H&P ADULT - NSICDXFAMILYHX_GEN_ALL_CORE_FT
FAMILY HISTORY:  No pertinent family history in first degree relatives FAMILY HISTORY:  No pertinent family history in first degree relatives     FAMILY HISTORY:  Father  Still living? Unknown  FHx: pancreatic cancer, Age at diagnosis: Age Unknown    Mother  Still living? Unknown  FH: brain cancer, Age at diagnosis: Age Unknown

## 2024-08-02 ENCOUNTER — TRANSCRIPTION ENCOUNTER (OUTPATIENT)
Age: 59
End: 2024-08-02

## 2024-08-02 VITALS
DIASTOLIC BLOOD PRESSURE: 83 MMHG | TEMPERATURE: 98 F | HEART RATE: 82 BPM | SYSTOLIC BLOOD PRESSURE: 136 MMHG | RESPIRATION RATE: 18 BRPM | OXYGEN SATURATION: 99 %

## 2024-08-02 LAB
APTT BLD: 34.1 SEC — SIGNIFICANT CHANGE UP (ref 24.5–35.6)
INR BLD: 0.88 RATIO — SIGNIFICANT CHANGE UP (ref 0.85–1.18)
PROTHROM AB SERPL-ACNC: 10 SEC — SIGNIFICANT CHANGE UP (ref 9.5–13)

## 2024-08-02 PROCEDURE — 99239 HOSP IP/OBS DSCHRG MGMT >30: CPT

## 2024-08-02 RX ORDER — PANTOPRAZOLE SODIUM 20 MG/1
1 TABLET, DELAYED RELEASE ORAL
Qty: 0 | Refills: 0 | DISCHARGE
Start: 2024-08-02

## 2024-08-02 RX ORDER — MULTIVITAMIN/IRON/FOLIC ACID 18MG-0.4MG
1 TABLET ORAL
Qty: 0 | Refills: 0 | DISCHARGE
Start: 2024-08-02

## 2024-08-02 RX ORDER — CYANOCOBALAMIN/FOLIC AC/VIT B6 2-2.5-25MG
1 TABLET ORAL
Qty: 0 | Refills: 0 | DISCHARGE
Start: 2024-08-02

## 2024-08-02 RX ORDER — GINGER ROOT/GINGER ROOT EXT 262.5 MG
1 CAPSULE ORAL
Qty: 0 | Refills: 0
Start: 2024-08-02

## 2024-08-02 RX ORDER — NICOTINE 21 MG/24HR
4 PATCH, TRANSDERMAL 24 HOURS TRANSDERMAL EVERY 8 HOURS
Refills: 0 | Status: DISCONTINUED | OUTPATIENT
Start: 2024-08-02 | End: 2024-08-02

## 2024-08-02 RX ADMIN — Medication 100 MILLIGRAM(S): at 10:37

## 2024-08-02 RX ADMIN — Medication 1 MILLIGRAM(S): at 10:36

## 2024-08-02 RX ADMIN — PANTOPRAZOLE SODIUM 40 MILLIGRAM(S): 20 TABLET, DELAYED RELEASE ORAL at 05:30

## 2024-08-02 RX ADMIN — Medication 4 MILLIGRAM(S): at 10:48

## 2024-08-02 RX ADMIN — Medication 1 TABLET(S): at 10:37

## 2024-08-02 NOTE — DISCHARGE NOTE NURSING/CASE MANAGEMENT/SOCIAL WORK - PATIENT PORTAL LINK FT
You can access the FollowMyHealth Patient Portal offered by [facility] by registering at the following website: [email or web address]. By joining [employer]’s FollowMyHealth portal, you will also be able to view your health information using other applications (apps) compatible with our system.

## 2024-08-02 NOTE — DISCHARGE NOTE PROVIDER - NSDCCPCAREPLAN_GEN_ALL_CORE_FT
PRINCIPAL DISCHARGE DIAGNOSIS  Diagnosis: Abdominal pain  Assessment and Plan of Treatment: You came in with abdominal pain, which improved. Please follow up with your primary care provider in 1-2 weeks following discharge from the hospital for continued monitoring and management.      SECONDARY DISCHARGE DIAGNOSES  Diagnosis: Alcohol dependence with withdrawal  Assessment and Plan of Treatment: You recieved one dose of medication for alcohol withdrawal, which improved. you are clear for discharge. Avoid alcohol. Please follow up with your primary care provider in 1-2 weeks following discharge from the hospital for continued monitoring and management.

## 2024-08-02 NOTE — DISCHARGE NOTE NURSING/CASE MANAGEMENT/SOCIAL WORK - NSDCPEFALRISK_GEN_ALL_CORE
For information on Fall & Injury Prevention, visit: [email or web address] OR  [email or web address] OR  https://www.cdc.gov/steadi/patient.html

## 2024-08-02 NOTE — PROVIDER CONTACT NOTE (OTHER) - RECOMMENDATIONS
As per orders CIWA scoring can go from Q2 to Q4 after 0230 score d/t pt scoring less than 7 for 8 consecutive hours.

## 2024-08-02 NOTE — DISCHARGE NOTE PROVIDER - ATTENDING DISCHARGE PHYSICAL EXAMINATION:
pt seen and examined by me  eager to be dc, wants to go smoke and see his wife  feeling better, no abd pain, dmitriy PO; no n/v  wants to visit with wife who is on 1:1 in another unit; spoke with wife's provider and she will allow wife to call pt; pt informed wife will call him as she has some restrictions at this time; pt agreeable to this plan  VSS  CHEST b/l AE  ABD + bs soft  a/w abd pain, EtOH w/d  pain resolved  currently low CIWA, no tremor  pt wants to leave, pt does not appear to have interest in alcohol cessation at this time  medically stable for dc

## 2024-08-02 NOTE — DISCHARGE NOTE PROVIDER - NSDCMRMEDTOKEN_GEN_ALL_CORE_FT
folic acid 1 mg oral tablet: 1 tab(s) orally once a day  Multiple Vitamins oral tablet: 1 tab(s) orally once a day  pantoprazole 40 mg oral delayed release tablet: 1 tab(s) orally once a day (before a meal)

## 2024-08-02 NOTE — DISCHARGE NOTE PROVIDER - HOSPITAL COURSE
58 yr old M with hx of asthma (not on any inhalers), incarceration,  alcohol abuse presents to the ED for one week of LUQ pain. Admitted for alcohol withdrawal     Alcohol dependence with withdrawal.   - PMHx of alcohol abuse, patient drinks 1-2 pints of vodka daily; denies hx of hospitalization for withdrawal /no history of seizures   - IN ED: CIWA: 8 -->4 s/p Libirum 50 x1   -CIWA 0-3 on floors, Symptom triggered Ativan ordered, not required   -thiamine / folate / multivitamin    Gastroduodenitis.  - one week of LUQ  abdominal pain   - still endorses episodes of severe LUQ pain   - worse with moving or breathing   - CT A/P: Focal wall thickening of distal portion of stomach and proximal duodenum, which may represent gastroduodenitis.   - will reach out to GI to discuss inpatient vs outpatient endoscopy     Elevated liver function tests.   - On admission AST/ALT: 173/158  - Diffuse hepatic steatosis on CT A/P  - likely 2/2 alcohol abuse history   - LFTs are downtrending     On 8/2/24, discussed with Dr. Murray, patient is medically cleared and optimized for discharge today. All medications were reviewed with attending, and sent to mutually agreed upon pharmacy.  Reviewed discharge medications with patient; All new medications requiring new prescription sent to pharmacy of patients choice. Reviewed need for prescription for previous home medications and new prescriptions sent if requested. Patient in agreement and understands. n   58 yr old M with hx of asthma (not on any inhalers), incarceration,  alcohol abuse presents to the ED for one week of LUQ pain. Admitted for alcohol withdrawal       On 8/2/24, discussed with Dr. Murray, patient is medically cleared and optimized for discharge today. All medications were reviewed with attending, and sent to mutually agreed upon pharmacy.  Reviewed discharge medications with patient; All new medications requiring new prescription sent to pharmacy of patients choice. Reviewed need for prescription for previous home medications and new prescriptions sent if requested. Patient in agreement and understands. n

## 2024-08-02 NOTE — DISCHARGE NOTE PROVIDER - NSFOLLOWUPCLINICS_GEN_ALL_ED_FT
[facility] General Internal Medicine  General Internal Medicine  [address] [place] [address]  Phone: [phone]  Fax:

## 2024-08-03 LAB
HAV IGM SER-ACNC: SIGNIFICANT CHANGE UP
HBV CORE IGM SER-ACNC: SIGNIFICANT CHANGE UP
HBV SURFACE AG SER-ACNC: SIGNIFICANT CHANGE UP
HCV AB S/CO SERPL IA: 0.11 S/CO — SIGNIFICANT CHANGE UP (ref 0–0.99)
HCV AB SERPL-IMP: SIGNIFICANT CHANGE UP

## 2024-08-15 ENCOUNTER — TRANSCRIPTION ENCOUNTER (OUTPATIENT)
Age: 59
End: 2024-08-15

## 2025-01-11 ENCOUNTER — EMERGENCY (EMERGENCY)
Facility: HOSPITAL | Age: 60
LOS: 1 days | Discharge: HOME | End: 2025-01-11
Attending: EMERGENCY MEDICINE | Admitting: EMERGENCY MEDICINE
Payer: SELF-PAY

## 2025-01-11 VITALS
WEIGHT: 139.99 LBS | SYSTOLIC BLOOD PRESSURE: 145 MMHG | DIASTOLIC BLOOD PRESSURE: 79 MMHG | HEIGHT: 63 IN | TEMPERATURE: 98 F | HEART RATE: 106 BPM | RESPIRATION RATE: 18 BRPM | OXYGEN SATURATION: 94 %

## 2025-01-11 VITALS
RESPIRATION RATE: 17 BRPM | HEART RATE: 100 BPM | SYSTOLIC BLOOD PRESSURE: 138 MMHG | DIASTOLIC BLOOD PRESSURE: 70 MMHG | OXYGEN SATURATION: 96 % | TEMPERATURE: 98 F

## 2025-01-11 DIAGNOSIS — Z98.890 OTHER SPECIFIED POSTPROCEDURAL STATES: Chronic | ICD-10-CM

## 2025-01-11 PROBLEM — F10.99 ALCOHOL USE, UNSPECIFIED WITH UNSPECIFIED ALCOHOL-INDUCED DISORDER: Chronic | Status: ACTIVE | Noted: 2024-08-13

## 2025-01-11 PROCEDURE — 99284 EMERGENCY DEPT VISIT MOD MDM: CPT

## 2025-01-11 RX ORDER — ACETAMINOPHEN 80 MG/.8ML
650 SOLUTION/ DROPS ORAL ONCE
Refills: 0 | Status: COMPLETED | OUTPATIENT
Start: 2025-01-11 | End: 2025-01-11

## 2025-01-11 RX ORDER — ONDANSETRON 4 MG/1
4 TABLET ORAL ONCE
Refills: 0 | Status: COMPLETED | OUTPATIENT
Start: 2025-01-11 | End: 2025-01-11

## 2025-01-11 RX ORDER — MAG HYDROX/ALUMINUM HYD/SIMETH 200-200-20
30 SUSPENSION, ORAL (FINAL DOSE FORM) ORAL ONCE
Refills: 0 | Status: COMPLETED | OUTPATIENT
Start: 2025-01-11 | End: 2025-01-11

## 2025-01-11 RX ADMIN — ACETAMINOPHEN 650 MILLIGRAM(S): 80 SOLUTION/ DROPS ORAL at 19:39

## 2025-01-11 RX ADMIN — ACETAMINOPHEN 650 MILLIGRAM(S): 80 SOLUTION/ DROPS ORAL at 20:09

## 2025-01-11 RX ADMIN — ONDANSETRON 4 MILLIGRAM(S): 4 TABLET ORAL at 17:24

## 2025-01-11 NOTE — ED ADULT NURSE NOTE - NSFALLHARMRISKINTERV_ED_ALL_ED

## 2025-01-11 NOTE — ED PROVIDER NOTE - OBJECTIVE STATEMENT
58 y/o male pt brought himself into ED for alcohol intoxication. Pt states he drank a galloon of vodka just prior to coming into ED. Pt states his wife passed away yesterday. Pt also endorses going to rehab in the past and having seizures in the past. Denies recent trauma, states this time does not feel like his withdrawals.

## 2025-01-11 NOTE — ED ADULT TRIAGE NOTE - CHIEF COMPLAINT QUOTE
States wife  last night, drank a gallon of vodka. Last drink "10 minutes prior to arrival."  Patient is poor historian. Sister-in-law, Esmer, [phone]. States wife  last night, drank a gallon of vodka. Last drink "10 minutes prior to arrival."  Patient is poor historian for medical history. No SI/HI. No visual/auditory hallucinations Sister-in-law, Esmer, [phone].

## 2025-01-11 NOTE — ED PROVIDER NOTE - NSFOLLOWUPCLINICS_GEN_ALL_ED_FT
[facility] [place]  Internal Medicine  [address] [place] [address]  Phone: [phone]  Fax: [phone]

## 2025-01-11 NOTE — ED ADULT NURSE NOTE - CHIEF COMPLAINT QUOTE
States wife  last night, drank a gallon of vodka. Last drink "10 minutes prior to arrival."  Patient is poor historian for medical history. No SI/HI. No visual/auditory hallucinations Sister-in-law, Esmer, [phone].

## 2025-01-11 NOTE — ED PROVIDER NOTE - NSICDXFAMILYHX_GEN_ALL_CORE_FT
FAMILY HISTORY:  Father  Still living? Unknown  FHx: pancreatic cancer, Age at diagnosis: Age Unknown    Mother  Still living? Unknown  FH: brain cancer, Age at diagnosis: Age Unknown

## 2025-01-11 NOTE — ED PROVIDER NOTE - PROGRESS NOTE DETAILS
Re-assessed patient, he is coherent, feels better after anti nausea medications, ambulating without difficulty. Will discharge.

## 2025-01-11 NOTE — ED PROVIDER NOTE - PHYSICAL EXAMINATION
GENERAL: NAD, no tongue fasciculations, no tremors, calm, cooperative  HEENT:  Atraumatic  CHEST/LUNG: Chest rise equal bilaterally  HEART: Regular rate and rhythm  EXTREMITIES:  Extremities warm  PSYCH: A&Ox3  SKIN: No obvious rashes or lesions

## 2025-01-11 NOTE — ED PROVIDER NOTE - PATIENT PORTAL LINK FT
You can access the FollowMyHealth Patient Portal offered by [facility] by registering at the following website: [email or web address]. By joining [employer]’s FollowMyHealth portal, you will also be able to view your health information using other applications (apps) compatible with our system.

## 2025-01-11 NOTE — ED ADULT NURSE NOTE - OBJECTIVE STATEMENT
Received patient in 25A c/o alcohol intoxication, drank 1 gallon of Vodka today. Patient stated his wife  yesterday. Patient denies SOB, chest pain, fever. Patient is A&OX3, airway patent, breathing unlabored and even, radial pulses palpable. Medication given as ordered. Side rails up and safety maintained. Fall precaution in place.

## 2025-01-11 NOTE — ED PROVIDER NOTE - ATTENDING CONTRIBUTION TO CARE
Pt does not endorse any trauma, is awake, cooperative, oriented X4, only complaining of nausea. will do FS and give nausea, PO challenge and let him metabolize alcohol and d/c home.  60 y/o male pt brought himself into ED for alcohol intoxication. Pt states he drank a galloon of vodka just prior to coming into ED. Pt states his wife passed away yesterday. Pt also endorses going to rehab in the past and having seizures in the past. Denies recent trauma, states this time does not feel like his withdrawals.  pt clinically sober, denies SI, HI, ah, vh.

## 2025-01-11 NOTE — ED PROVIDER NOTE - NSICDXPASTMEDICALHX_GEN_ALL_CORE_FT
PAST MEDICAL HISTORY:  Alcohol use, unspecified with unspecified alcohol-induced disorder     Asthma      Post-Care Instructions: I reviewed with the patient in detail post-care instructions. Patient is not to engage in any heavy lifting, exercise, or swimming for the next 14 days. Should the patient develop any fevers, chills, bleeding, severe pain patient will contact the office immediately.

## 2025-01-11 NOTE — ED PROVIDER NOTE - CLINICAL SUMMARY MEDICAL DECISION MAKING FREE TEXT BOX
Pt does not endorse any trauma, is awake, cooperative, oriented X4, only complaining of nausea. will do FS and give nausea, PO challenge and let him metabolize alcohol and d/c home.

## 2025-01-11 NOTE — ED ADULT NURSE NOTE - ISOLATION TYPE:
Drink plenty of fluids.      Get plenty of rest.      Tylenol or Motrin as needed.      Go to the ER with any significant change or worsening of symptoms.     Follow up with your primary care doctor.     
None

## 2025-01-11 NOTE — ED ADULT NURSE NOTE - NSICDXPASTMEDICALHX_GEN_ALL_CORE_FT
PAST MEDICAL HISTORY:  Alcohol use, unspecified with unspecified alcohol-induced disorder     Asthma

## 2025-05-12 ENCOUNTER — TRANSCRIPTION ENCOUNTER (OUTPATIENT)
Age: 60
End: 2025-05-12

## 2025-05-22 NOTE — ED PROVIDER NOTE - NSDCPRINTRESULTS_ED_ALL_ED
Tazorac Pregnancy And Lactation Text: This medication is not safe during pregnancy. It is unknown if this medication is excreted in breast milk. Dapsone Counseling: I discussed with the patient the risks of dapsone including but not limited to hemolytic anemia, agranulocytosis, rashes, methemoglobinemia, kidney failure, peripheral neuropathy, headaches, GI upset, and liver toxicity.  Patients who start dapsone require monitoring including baseline LFTs and weekly CBCs for the first month, then every month thereafter.  The patient verbalized understanding of the proper use and possible adverse effects of dapsone.  All of the patient's questions and concerns were addressed. High Dose Vitamin A Pregnancy And Lactation Text: High dose vitamin A therapy is contraindicated during pregnancy and breast feeding. Birth Control Pills Counseling: Birth Control Pill Counseling: I discussed with the patient the potential side effects of OCPs including but not limited to increased risk of stroke, heart attack, thrombophlebitis, deep venous thrombosis, hepatic adenomas, breast changes, GI upset, headaches, and depression.  The patient verbalized understanding of the proper use and possible adverse effects of OCPs. All of the patient's questions and concerns were addressed. Detail Level: Zone Doxycycline Counseling:  Patient counseled regarding possible photosensitivity and increased risk for sunburn.  Patient instructed to avoid sunlight, if possible.  When exposed to sunlight, patients should wear protective clothing, sunglasses, and sunscreen.  The patient was instructed to call the office immediately if the following severe adverse effects occur:  hearing changes, easy bruising/bleeding, severe headache, or vision changes.  The patient verbalized understanding of the proper use and possible adverse effects of doxycycline.  All of the patient's questions and concerns were addressed. Minocycline Pregnancy And Lactation Text: This medication is Pregnancy Category D and not consider safe during pregnancy. It is also excreted in breast milk. Azelaic Acid Counseling: Patient counseled that medicine may cause skin irritation and to avoid applying near the eyes.  In the event of skin irritation, the patient was advised to reduce the amount of the drug applied or use it less frequently.   The patient verbalized understanding of the proper use and possible adverse effects of azelaic acid.  All of the patient's questions and concerns were addressed. Topical Clindamycin Pregnancy And Lactation Text: This medication is Pregnancy Category B and is considered safe during pregnancy. It is unknown if it is excreted in breast milk. Benzoyl Peroxide Counseling: Patient counseled that medicine may cause skin irritation and bleach clothing.  In the event of skin irritation, the patient was advised to reduce the amount of the drug applied or use it less frequently.   The patient verbalized understanding of the proper use and possible adverse effects of benzoyl peroxide.  All of the patient's questions and concerns were addressed. Doxycycline Pregnancy And Lactation Text: This medication is Pregnancy Category D and not consider safe during pregnancy. It is also excreted in breast milk but is considered safe for shorter treatment courses. Sarecycline Counseling: Patient advised regarding possible photosensitivity and discoloration of the teeth, skin, lips, tongue and gums.  Patient instructed to avoid sunlight, if possible.  When exposed to sunlight, patients should wear protective clothing, sunglasses, and sunscreen.  The patient was instructed to call the office immediately if the following severe adverse effects occur:  hearing changes, easy bruising/bleeding, severe headache, or vision changes.  The patient verbalized understanding of the proper use and possible adverse effects of sarecycline.  All of the patient's questions and concerns were addressed. Bactrim Counseling:  I discussed with the patient the risks of sulfa antibiotics including but not limited to GI upset, allergic reaction, drug rash, diarrhea, dizziness, photosensitivity, and yeast infections.  Rarely, more serious reactions can occur including but not limited to aplastic anemia, agranulocytosis, methemoglobinemia, blood dyscrasias, liver or kidney failure, lung infiltrates or desquamative/blistering drug rashes. Erythromycin Pregnancy And Lactation Text: This medication is Pregnancy Category B and is considered safe during pregnancy. It is also excreted in breast milk. Spironolactone Counseling: Patient advised regarding risks of diarrhea, abdominal pain, hyperkalemia, birth defects (for female patients), liver toxicity and renal toxicity. The patient may need blood work to monitor liver and kidney function and potassium levels while on therapy. The patient verbalized understanding of the proper use and possible adverse effects of spironolactone.  All of the patient's questions and concerns were addressed. Azithromycin Counseling:  I discussed with the patient the risks of azithromycin including but not limited to GI upset, allergic reaction, drug rash, diarrhea, and yeast infections. Benzoyl Peroxide Pregnancy And Lactation Text: This medication is Pregnancy Category C. It is unknown if benzoyl peroxide is excreted in breast milk. Isotretinoin Pregnancy And Lactation Text: This medication is Pregnancy Category X and is considered extremely dangerous during pregnancy. It is unknown if it is excreted in breast milk. Tetracycline Counseling: Patient counseled regarding possible photosensitivity and increased risk for sunburn.  Patient instructed to avoid sunlight, if possible.  When exposed to sunlight, patients should wear protective clothing, sunglasses, and sunscreen.  The patient was instructed to call the office immediately if the following severe adverse effects occur:  hearing changes, easy bruising/bleeding, severe headache, or vision changes.  The patient verbalized understanding of the proper use and possible adverse effects of tetracycline.  All of the patient's questions and concerns were addressed. Patient understands to avoid pregnancy while on therapy due to potential birth defects. Winlevi Counseling:  I discussed with the patient the risks of topical clascoterone including but not limited to erythema, scaling, itching, and stinging. Patient voiced their understanding. Topical Retinoid Pregnancy And Lactation Text: This medication is Pregnancy Category C. It is unknown if this medication is excreted in breast milk. Aklief counseling:  Patient advised to apply a pea-sized amount only at bedtime and wait 30 minutes after washing their face before applying.  If too drying, patient may add a non-comedogenic moisturizer.  The most commonly reported side effects including irritation, redness, scaling, dryness, stinging, burning, itching, and increased risk of sunburn.  The patient verbalized understanding of the proper use and possible adverse effects of retinoids.  All of the patient's questions and concerns were addressed. High Dose Vitamin A Counseling: Side effects reviewed, pt to contact office should one occur. Birth Control Pills Pregnancy And Lactation Text: This medication should be avoided if pregnant and for the first 30 days post-partum. Minocycline Counseling: Patient advised regarding possible photosensitivity and discoloration of the teeth, skin, lips, tongue and gums.  Patient instructed to avoid sunlight, if possible.  When exposed to sunlight, patients should wear protective clothing, sunglasses, and sunscreen.  The patient was instructed to call the office immediately if the following severe adverse effects occur:  hearing changes, easy bruising/bleeding, severe headache, or vision changes.  The patient verbalized understanding of the proper use and possible adverse effects of minocycline.  All of the patient's questions and concerns were addressed. Aklief Pregnancy And Lactation Text: It is unknown if this medication is safe to use during pregnancy.  It is unknown if this medication is excreted in breast milk.  Breastfeeding women should use the topical cream on the smallest area of the skin for the shortest time needed while breastfeeding.  Do not apply to nipple and areola. Dapsone Pregnancy And Lactation Text: This medication is Pregnancy Category C and is not considered safe during pregnancy or breast feeding. Topical Clindamycin Counseling: Patient counseled that this medication may cause skin irritation or allergic reactions.  In the event of skin irritation, the patient was advised to reduce the amount of the drug applied or use it less frequently.   The patient verbalized understanding of the proper use and possible adverse effects of clindamycin.  All of the patient's questions and concerns were addressed. Azelaic Acid Pregnancy And Lactation Text: This medication is considered safe during pregnancy and breast feeding. Topical Sulfur Applications Counseling: Topical Sulfur Counseling: Patient counseled that this medication may cause skin irritation or allergic reactions.  In the event of skin irritation, the patient was advised to reduce the amount of the drug applied or use it less frequently.   The patient verbalized understanding of the proper use and possible adverse effects of topical sulfur application.  All of the patient's questions and concerns were addressed. Include Pregnancy/Lactation Warning?: Add Automatically Based on Childbearing Potential and Patient Age Topical Sulfur Applications Pregnancy And Lactation Text: This medication is Pregnancy Category C and has an unknown safety profile during pregnancy. It is unknown if this topical medication is excreted in breast milk. Erythromycin Counseling:  I discussed with the patient the risks of erythromycin including but not limited to GI upset, allergic reaction, drug rash, diarrhea, increase in liver enzymes, and yeast infections. Use Enhanced Medication Counseling?: No Bactrim Pregnancy And Lactation Text: This medication is Pregnancy Category D and is known to cause fetal risk.  It is also excreted in breast milk. Spironolactone Pregnancy And Lactation Text: This medication can cause feminization of the male fetus and should be avoided during pregnancy. The active metabolite is also found in breast milk. Topical Retinoid counseling:  Patient advised to apply a pea-sized amount only at bedtime and wait 30 minutes after washing their face before applying.  If too drying, patient may add a non-comedogenic moisturizer. The patient verbalized understanding of the proper use and possible adverse effects of retinoids.  All of the patient's questions and concerns were addressed. Winlevi Pregnancy And Lactation Text: This medication is considered safe during pregnancy and breastfeeding. Azithromycin Pregnancy And Lactation Text: This medication is considered safe during pregnancy and is also secreted in breast milk. Tazorac Counseling:  Patient advised that medication is irritating and drying.  Patient may need to apply sparingly and wash off after an hour before eventually leaving it on overnight.  The patient verbalized understanding of the proper use and possible adverse effects of tazorac.  All of the patient's questions and concerns were addressed. Isotretinoin Counseling: Patient should get monthly blood tests, not donate blood, not drive at night if vision affected, not share medication, and not undergo elective surgery for 6 months after tx completed. Side effects reviewed, pt to contact office should one occur. Patient requests all Lab, Cardiology, and Radiology Results on their Discharge Instructions